# Patient Record
Sex: FEMALE | Race: WHITE | NOT HISPANIC OR LATINO | URBAN - METROPOLITAN AREA
[De-identification: names, ages, dates, MRNs, and addresses within clinical notes are randomized per-mention and may not be internally consistent; named-entity substitution may affect disease eponyms.]

---

## 2018-02-24 ENCOUNTER — EMERGENCY (EMERGENCY)
Facility: HOSPITAL | Age: 78
LOS: 1 days | Discharge: HOME | End: 2018-02-24
Attending: EMERGENCY MEDICINE | Admitting: EMERGENCY MEDICINE
Payer: MEDICARE

## 2018-02-24 VITALS
SYSTOLIC BLOOD PRESSURE: 167 MMHG | RESPIRATION RATE: 18 BRPM | TEMPERATURE: 98 F | OXYGEN SATURATION: 100 % | DIASTOLIC BLOOD PRESSURE: 77 MMHG | HEART RATE: 64 BPM

## 2018-02-24 PROCEDURE — 70450 CT HEAD/BRAIN W/O DYE: CPT | Mod: 26

## 2018-02-24 PROCEDURE — 99284 EMERGENCY DEPT VISIT MOD MDM: CPT | Mod: 25

## 2018-02-24 PROCEDURE — 70450 CT HEAD/BRAIN W/O DYE: CPT

## 2018-02-24 PROCEDURE — 99284 EMERGENCY DEPT VISIT MOD MDM: CPT | Mod: GC

## 2018-02-24 PROCEDURE — 70486 CT MAXILLOFACIAL W/O DYE: CPT | Mod: 26

## 2018-02-24 PROCEDURE — 70486 CT MAXILLOFACIAL W/O DYE: CPT

## 2018-02-24 NOTE — ED ADULT NURSE NOTE - OBJECTIVE STATEMENT
76 Yo male A&OX3 presents to the ED with the c/o fall while picking up a newspaper. Pt states that she fell onto face, no numbness or tingling. Pt states that she did no lose consciousness, on ASA 81mg daily. Lungs clear, equal b/l no cough and no sob.

## 2018-02-24 NOTE — ED PROVIDER NOTE - PHYSICAL EXAMINATION
Attending MD Del Real: A & O x 3, NAD, +left forehead hematoma, +ecchymosis b/l periorbital area, no focal bony facial ttp, nontender spine, EOMI b/l, PERRL b/l; lungs CTAB, heart with reg rhythm without murmur; abdomen soft NTND; extremities with no edema; affect appropriate. neuro exam non focal with no motor or sensory deficits. nontender extremities resident: Gen: NAD, AOx3  Head: bruising and swelling to left forehead with extension to bilateral periorbital area, tenderness over forehead, no periorbital tenderness, EOMI and painless  HEENT: PERRL, oral mucosa moist, normal conjunctiva  Lung: CTAB, no respiratory distress  CV: rrr, no murmurs, Normal perfusion  Abd: soft, NTND, no CVA tenderness  MSK: No edema, no visible deformities, soft compartments, all extremity joints nontender with full ROM; chest wall nontender; entire spine without midline tenderness and with full ROM, no stepoffs or masses   Neuro: No focal neurologic deficits, CN intact, motor and sensation intact, no cerebellar signs   Skin: No rash   Psych: normal affect     Attending MD Nasima: A & O x 3, NAD, +left forehead hematoma, +ecchymosis b/l periorbital area, no focal bony facial ttp, nontender spine, EOMI b/l, PERRL b/l; lungs CTAB, heart with reg rhythm without murmur; abdomen soft NTND; extremities with no edema; affect appropriate. neuro exam non focal with no motor or sensory deficits. nontender extremities resident: Gen: NAD, AOx3  Head: bruising and swelling to left forehead with extension to bilateral periorbital area, tenderness over forehead, no periorbital tenderness, EOMI and painless  HEENT: PERRL, oral mucosa moist, normal conjunctiva, no septal hematoma  Lung: CTAB, no respiratory distress  CV: rrr, no murmurs, Normal perfusion  Abd: soft, NTND, no CVA tenderness  MSK: No edema, no visible deformities, soft compartments, b/l no snuffbox tenderness, all extremity joints nontender with full ROM; chest wall nontender; entire spine without midline tenderness and with full ROM, no stepoffs or masses   Neuro: No focal neurologic deficits, CN intact, motor and sensation intact, no cerebellar signs   Skin: No rash   Psych: normal affect     Attending MD Nasima: A & O x 3, NAD, +left forehead hematoma, +ecchymosis b/l periorbital area, no focal bony facial ttp, nontender spine, EOMI b/l, PERRL b/l; lungs CTAB, heart with reg rhythm without murmur; abdomen soft NTND; extremities with no edema; affect appropriate. neuro exam non focal with no motor or sensory deficits. nontender extremities

## 2018-02-24 NOTE — ED PROVIDER NOTE - ATTENDING CONTRIBUTION TO CARE
Attending MD Del Real:  I personally have seen and examined this patient.  Resident note reviewed and agree on plan of care and except where noted.  See HPI, PE, and MDM for details.

## 2018-02-24 NOTE — ED PROVIDER NOTE - MEDICAL DECISION MAKING DETAILS
Attending MD Del Real: 77F presenting with facial bruising s/p mechanical fall 2 days prior with face strike, worsening ecchymosis of face. Exam with left forehead hematoma with b/l periorbtial ecchymosis, likely dependent changes of ecchymosis but will obtain CT head and max-face to r/o ICH or underlying facial fx's. Cervical spine cleared clinically of fracture without need for imaging according to [facility] Criteria

## 2018-02-24 NOTE — ED PROVIDER NOTE - SHIFT CHANGE DETAILS
Attending MD Her: Mechanical fall onto face 2 days ago, now with periorbital ecchymosis, appears dependent, pending CT head and CT Max/Face.  If negative, can go home with outpatient follow up with PMD.

## 2018-02-24 NOTE — ED PROVIDER NOTE - OBJECTIVE STATEMENT
Patient is 77 y F with PMH CAD with stent 2011, RA on methotrexate, hld presenting with 2 days ago, bent down to get newspaper lost balance and fell forward and hit face on marble floor, no LOC, before fall had no lightheadedness or pain, comes to ED because she is concerned about facial bruising. +ambulatory. +ASA81 no blood thinners. no pain with extraoccular movements, no double vision, no blurry vision.     PMD/cards: cameron  ROS: Denies fever, palpitations, chills, recent sickness, HA, vision changes, cough, SOB, chest pain, abdominal pain, n/v/d/c, dysuria, hematuria, rash, new joint aches, sick contacts, and recent travel.

## 2018-02-24 NOTE — ED PROVIDER NOTE - PROGRESS NOTE DETAILS
Attending MD Her: Patient re-evaluated and feeling improved.  No acute issues at  this time.  Radiology tests reviewed with patient.  Patient stable for discharge. Follow up instructions given to follow up with PMD in 24-48hrs, importance of follow up emphasized, return to ED parameters reviewed and patient verbalized understanding.  All questions answered, all concerns addressed. Patient is ambulatory, not in pain, CTs are negative for acute pathology. ok to dc home. -SM

## 2018-04-10 ENCOUNTER — APPOINTMENT (OUTPATIENT)
Dept: OPHTHALMOLOGY | Facility: CLINIC | Age: 78
End: 2018-04-10
Payer: MEDICARE

## 2018-04-10 PROCEDURE — 92004 COMPRE OPH EXAM NEW PT 1/>: CPT

## 2018-05-21 ENCOUNTER — APPOINTMENT (OUTPATIENT)
Dept: OPHTHALMOLOGY | Facility: CLINIC | Age: 78
End: 2018-05-21
Payer: MEDICARE

## 2018-05-21 PROCEDURE — ZZZZZ: CPT

## 2018-05-21 PROCEDURE — 92012 INTRM OPH EXAM EST PATIENT: CPT

## 2018-06-15 ENCOUNTER — APPOINTMENT (OUTPATIENT)
Dept: OPHTHALMOLOGY | Facility: CLINIC | Age: 78
End: 2018-06-15
Payer: MEDICARE

## 2018-06-15 PROCEDURE — 92015 DETERMINE REFRACTIVE STATE: CPT

## 2020-07-11 ENCOUNTER — OUTPATIENT (OUTPATIENT)
Dept: OUTPATIENT SERVICES | Facility: HOSPITAL | Age: 80
LOS: 1 days | End: 2020-07-11
Payer: MEDICARE

## 2020-07-11 ENCOUNTER — APPOINTMENT (OUTPATIENT)
Dept: CT IMAGING | Facility: CLINIC | Age: 80
End: 2020-07-11
Payer: MEDICARE

## 2020-07-11 DIAGNOSIS — Z71.9 COUNSELING, UNSPECIFIED: ICD-10-CM

## 2020-07-11 PROCEDURE — 74177 CT ABD & PELVIS W/CONTRAST: CPT

## 2020-07-11 PROCEDURE — 74177 CT ABD & PELVIS W/CONTRAST: CPT | Mod: 26

## 2020-07-13 PROBLEM — Z95.5 PRESENCE OF CORONARY ANGIOPLASTY IMPLANT AND GRAFT: Chronic | Status: ACTIVE | Noted: 2018-02-24

## 2020-07-13 PROBLEM — M19.90 UNSPECIFIED OSTEOARTHRITIS, UNSPECIFIED SITE: Chronic | Status: ACTIVE | Noted: 2018-02-24

## 2020-07-22 ENCOUNTER — OUTPATIENT (OUTPATIENT)
Dept: OUTPATIENT SERVICES | Facility: HOSPITAL | Age: 80
LOS: 1 days | End: 2020-07-22
Payer: MEDICARE

## 2020-07-22 ENCOUNTER — APPOINTMENT (OUTPATIENT)
Dept: MRI IMAGING | Facility: CLINIC | Age: 80
End: 2020-07-22
Payer: MEDICARE

## 2020-07-22 DIAGNOSIS — Z00.8 ENCOUNTER FOR OTHER GENERAL EXAMINATION: ICD-10-CM

## 2020-07-22 PROCEDURE — 72197 MRI PELVIS W/O & W/DYE: CPT | Mod: 26

## 2020-07-22 PROCEDURE — 72197 MRI PELVIS W/O & W/DYE: CPT

## 2020-07-22 PROCEDURE — A9585: CPT

## 2020-07-29 ENCOUNTER — RESULT REVIEW (OUTPATIENT)
Age: 80
End: 2020-07-29

## 2021-07-28 ENCOUNTER — NON-APPOINTMENT (OUTPATIENT)
Age: 81
End: 2021-07-28

## 2021-07-28 ENCOUNTER — APPOINTMENT (OUTPATIENT)
Dept: OPHTHALMOLOGY | Facility: CLINIC | Age: 81
End: 2021-07-28
Payer: MEDICARE

## 2021-07-28 PROCEDURE — 99072 ADDL SUPL MATRL&STAF TM PHE: CPT

## 2021-07-28 PROCEDURE — 92012 INTRM OPH EXAM EST PATIENT: CPT

## 2022-06-03 ENCOUNTER — INPATIENT (INPATIENT)
Facility: HOSPITAL | Age: 82
LOS: 9 days | Discharge: NURSING, REHAB OR LONG-TERM CARE | DRG: 300 | End: 2022-06-13
Attending: INTERNAL MEDICINE | Admitting: INTERNAL MEDICINE
Payer: MEDICARE

## 2022-06-03 VITALS
SYSTOLIC BLOOD PRESSURE: 132 MMHG | HEIGHT: 60 IN | OXYGEN SATURATION: 92 % | HEART RATE: 85 BPM | WEIGHT: 106.92 LBS | RESPIRATION RATE: 20 BRPM | DIASTOLIC BLOOD PRESSURE: 65 MMHG | TEMPERATURE: 98 F

## 2022-06-03 DIAGNOSIS — L03.90 CELLULITIS, UNSPECIFIED: ICD-10-CM

## 2022-06-03 DIAGNOSIS — I25.10 ATHEROSCLEROTIC HEART DISEASE OF NATIVE CORONARY ARTERY WITHOUT ANGINA PECTORIS: ICD-10-CM

## 2022-06-03 DIAGNOSIS — Z29.9 ENCOUNTER FOR PROPHYLACTIC MEASURES, UNSPECIFIED: ICD-10-CM

## 2022-06-03 DIAGNOSIS — E78.5 HYPERLIPIDEMIA, UNSPECIFIED: ICD-10-CM

## 2022-06-03 LAB
ALBUMIN SERPL ELPH-MCNC: 3.2 G/DL — LOW (ref 3.3–5)
ALP SERPL-CCNC: 57 U/L — SIGNIFICANT CHANGE UP (ref 40–120)
ALT FLD-CCNC: 33 U/L — SIGNIFICANT CHANGE UP (ref 10–45)
ANION GAP SERPL CALC-SCNC: 10 MMOL/L — SIGNIFICANT CHANGE UP (ref 5–17)
ANION GAP SERPL CALC-SCNC: 12 MMOL/L — SIGNIFICANT CHANGE UP (ref 5–17)
ANISOCYTOSIS BLD QL: SLIGHT — SIGNIFICANT CHANGE UP
APTT BLD: 33.6 SEC — SIGNIFICANT CHANGE UP (ref 27.5–35.5)
AST SERPL-CCNC: 73 U/L — HIGH (ref 10–40)
BASE EXCESS BLDV CALC-SCNC: 0.1 MMOL/L — SIGNIFICANT CHANGE UP (ref -2–2)
BASOPHILS # BLD AUTO: 0 K/UL — SIGNIFICANT CHANGE UP (ref 0–0.2)
BASOPHILS NFR BLD AUTO: 0 % — SIGNIFICANT CHANGE UP (ref 0–2)
BILIRUB SERPL-MCNC: 0.5 MG/DL — SIGNIFICANT CHANGE UP (ref 0.2–1.2)
BUN SERPL-MCNC: 19 MG/DL — SIGNIFICANT CHANGE UP (ref 7–23)
BUN SERPL-MCNC: 20 MG/DL — SIGNIFICANT CHANGE UP (ref 7–23)
BURR CELLS BLD QL SMEAR: SLIGHT — SIGNIFICANT CHANGE UP
CA-I SERPL-SCNC: 1.25 MMOL/L — SIGNIFICANT CHANGE UP (ref 1.15–1.33)
CALCIUM SERPL-MCNC: 8.6 MG/DL — SIGNIFICANT CHANGE UP (ref 8.4–10.5)
CALCIUM SERPL-MCNC: 9.6 MG/DL — SIGNIFICANT CHANGE UP (ref 8.4–10.5)
CHLORIDE BLDV-SCNC: 105 MMOL/L — SIGNIFICANT CHANGE UP (ref 96–108)
CHLORIDE SERPL-SCNC: 105 MMOL/L — SIGNIFICANT CHANGE UP (ref 96–108)
CHLORIDE SERPL-SCNC: 105 MMOL/L — SIGNIFICANT CHANGE UP (ref 96–108)
CO2 BLDV-SCNC: 26 MMOL/L — SIGNIFICANT CHANGE UP (ref 22–26)
CO2 SERPL-SCNC: 20 MMOL/L — LOW (ref 22–31)
CO2 SERPL-SCNC: 23 MMOL/L — SIGNIFICANT CHANGE UP (ref 22–31)
CREAT SERPL-MCNC: 0.66 MG/DL — SIGNIFICANT CHANGE UP (ref 0.5–1.3)
CREAT SERPL-MCNC: 0.72 MG/DL — SIGNIFICANT CHANGE UP (ref 0.5–1.3)
EGFR: 82 ML/MIN/1.73M2 — SIGNIFICANT CHANGE UP
EGFR: 86 ML/MIN/1.73M2 — SIGNIFICANT CHANGE UP
ELLIPTOCYTES BLD QL SMEAR: SLIGHT — SIGNIFICANT CHANGE UP
EOSINOPHIL # BLD AUTO: 2.97 K/UL — HIGH (ref 0–0.5)
EOSINOPHIL NFR BLD AUTO: 27.3 % — HIGH (ref 0–6)
GAS PNL BLDV: 133 MMOL/L — LOW (ref 136–145)
GAS PNL BLDV: SIGNIFICANT CHANGE UP
GAS PNL BLDV: SIGNIFICANT CHANGE UP
GLUCOSE BLDV-MCNC: 83 MG/DL — SIGNIFICANT CHANGE UP (ref 70–99)
GLUCOSE SERPL-MCNC: 109 MG/DL — HIGH (ref 70–99)
GLUCOSE SERPL-MCNC: 81 MG/DL — SIGNIFICANT CHANGE UP (ref 70–99)
HCO3 BLDV-SCNC: 25 MMOL/L — SIGNIFICANT CHANGE UP (ref 22–29)
HCT VFR BLD CALC: 36.6 % — SIGNIFICANT CHANGE UP (ref 34.5–45)
HCT VFR BLDA CALC: 37 % — SIGNIFICANT CHANGE UP (ref 34.5–46.5)
HGB BLD CALC-MCNC: 12.2 G/DL — SIGNIFICANT CHANGE UP (ref 11.7–16.1)
HGB BLD-MCNC: 11.8 G/DL — SIGNIFICANT CHANGE UP (ref 11.5–15.5)
INR BLD: 1.2 RATIO — HIGH (ref 0.88–1.16)
LACTATE BLDV-MCNC: 2.1 MMOL/L — HIGH (ref 0.7–2)
LACTATE SERPL-SCNC: 1.5 MMOL/L — SIGNIFICANT CHANGE UP (ref 0.7–2)
LYMPHOCYTES # BLD AUTO: 1.35 K/UL — SIGNIFICANT CHANGE UP (ref 1–3.3)
LYMPHOCYTES # BLD AUTO: 12.4 % — LOW (ref 13–44)
MACROCYTES BLD QL: SLIGHT — SIGNIFICANT CHANGE UP
MANUAL SMEAR VERIFICATION: SIGNIFICANT CHANGE UP
MCHC RBC-ENTMCNC: 28.2 PG — SIGNIFICANT CHANGE UP (ref 27–34)
MCHC RBC-ENTMCNC: 32.2 GM/DL — SIGNIFICANT CHANGE UP (ref 32–36)
MCV RBC AUTO: 87.4 FL — SIGNIFICANT CHANGE UP (ref 80–100)
MONOCYTES # BLD AUTO: 0.99 K/UL — HIGH (ref 0–0.9)
MONOCYTES NFR BLD AUTO: 9.1 % — SIGNIFICANT CHANGE UP (ref 2–14)
NEUTROPHILS # BLD AUTO: 5.48 K/UL — SIGNIFICANT CHANGE UP (ref 1.8–7.4)
NEUTROPHILS NFR BLD AUTO: 50.4 % — SIGNIFICANT CHANGE UP (ref 43–77)
NT-PROBNP SERPL-SCNC: 950 PG/ML — HIGH (ref 0–300)
PCO2 BLDV: 39 MMHG — SIGNIFICANT CHANGE UP (ref 39–42)
PH BLDV: 7.41 — SIGNIFICANT CHANGE UP (ref 7.32–7.43)
PLAT MORPH BLD: NORMAL — SIGNIFICANT CHANGE UP
PLATELET # BLD AUTO: 325 K/UL — SIGNIFICANT CHANGE UP (ref 150–400)
PO2 BLDV: 42 MMHG — SIGNIFICANT CHANGE UP (ref 25–45)
POIKILOCYTOSIS BLD QL AUTO: SLIGHT — SIGNIFICANT CHANGE UP
POLYCHROMASIA BLD QL SMEAR: SLIGHT — SIGNIFICANT CHANGE UP
POTASSIUM BLDV-SCNC: 6 MMOL/L — HIGH (ref 3.5–5.1)
POTASSIUM SERPL-MCNC: 3.8 MMOL/L — SIGNIFICANT CHANGE UP (ref 3.5–5.3)
POTASSIUM SERPL-MCNC: 6.1 MMOL/L — HIGH (ref 3.5–5.3)
POTASSIUM SERPL-SCNC: 3.8 MMOL/L — SIGNIFICANT CHANGE UP (ref 3.5–5.3)
POTASSIUM SERPL-SCNC: 6.1 MMOL/L — HIGH (ref 3.5–5.3)
PROT SERPL-MCNC: 7.8 G/DL — SIGNIFICANT CHANGE UP (ref 6–8.3)
PROTHROM AB SERPL-ACNC: 13.9 SEC — HIGH (ref 10.5–13.4)
RAPID RVP RESULT: SIGNIFICANT CHANGE UP
RBC # BLD: 4.19 M/UL — SIGNIFICANT CHANGE UP (ref 3.8–5.2)
RBC # FLD: 15.9 % — HIGH (ref 10.3–14.5)
RBC BLD AUTO: ABNORMAL
SAO2 % BLDV: 74.8 % — SIGNIFICANT CHANGE UP (ref 67–88)
SARS-COV-2 RNA SPEC QL NAA+PROBE: SIGNIFICANT CHANGE UP
SODIUM SERPL-SCNC: 137 MMOL/L — SIGNIFICANT CHANGE UP (ref 135–145)
SODIUM SERPL-SCNC: 138 MMOL/L — SIGNIFICANT CHANGE UP (ref 135–145)
TARGETS BLD QL SMEAR: SLIGHT — SIGNIFICANT CHANGE UP
VARIANT LYMPHS # BLD: 0.8 % — SIGNIFICANT CHANGE UP (ref 0–6)
WBC # BLD: 10.87 K/UL — HIGH (ref 3.8–10.5)
WBC # FLD AUTO: 10.87 K/UL — HIGH (ref 3.8–10.5)

## 2022-06-03 PROCEDURE — 99223 1ST HOSP IP/OBS HIGH 75: CPT

## 2022-06-03 PROCEDURE — 71045 X-RAY EXAM CHEST 1 VIEW: CPT | Mod: 26

## 2022-06-03 PROCEDURE — 73590 X-RAY EXAM OF LOWER LEG: CPT | Mod: 26,50

## 2022-06-03 PROCEDURE — 99285 EMERGENCY DEPT VISIT HI MDM: CPT

## 2022-06-03 RX ORDER — FOLIC ACID 0.8 MG
0 TABLET ORAL
Qty: 0 | Refills: 0 | DISCHARGE

## 2022-06-03 RX ORDER — LANOLIN ALCOHOL/MO/W.PET/CERES
3 CREAM (GRAM) TOPICAL AT BEDTIME
Refills: 0 | Status: DISCONTINUED | OUTPATIENT
Start: 2022-06-03 | End: 2022-06-13

## 2022-06-03 RX ORDER — CEFAZOLIN SODIUM 1 G
2000 VIAL (EA) INJECTION ONCE
Refills: 0 | Status: COMPLETED | OUTPATIENT
Start: 2022-06-03 | End: 2022-06-03

## 2022-06-03 RX ORDER — ASPIRIN/CALCIUM CARB/MAGNESIUM 324 MG
81 TABLET ORAL DAILY
Refills: 0 | Status: DISCONTINUED | OUTPATIENT
Start: 2022-06-03 | End: 2022-06-13

## 2022-06-03 RX ORDER — VANCOMYCIN HCL 1 G
1000 VIAL (EA) INTRAVENOUS ONCE
Refills: 0 | Status: COMPLETED | OUTPATIENT
Start: 2022-06-03 | End: 2022-06-03

## 2022-06-03 RX ORDER — FOLIC ACID 0.8 MG
1 TABLET ORAL DAILY
Refills: 0 | Status: DISCONTINUED | OUTPATIENT
Start: 2022-06-03 | End: 2022-06-13

## 2022-06-03 RX ORDER — METHOTREXATE 2.5 MG/1
0 TABLET ORAL
Qty: 0 | Refills: 0 | DISCHARGE

## 2022-06-03 RX ORDER — FOLIC ACID 0.8 MG
1 TABLET ORAL
Qty: 0 | Refills: 0 | DISCHARGE

## 2022-06-03 RX ORDER — ACETAMINOPHEN 500 MG
650 TABLET ORAL EVERY 6 HOURS
Refills: 0 | Status: DISCONTINUED | OUTPATIENT
Start: 2022-06-03 | End: 2022-06-13

## 2022-06-03 RX ORDER — METOPROLOL TARTRATE 50 MG
0 TABLET ORAL
Qty: 0 | Refills: 0 | DISCHARGE

## 2022-06-03 RX ORDER — PIPERACILLIN AND TAZOBACTAM 4; .5 G/20ML; G/20ML
3.38 INJECTION, POWDER, LYOPHILIZED, FOR SOLUTION INTRAVENOUS ONCE
Refills: 0 | Status: COMPLETED | OUTPATIENT
Start: 2022-06-03 | End: 2022-06-03

## 2022-06-03 RX ORDER — PETROLATUM,WHITE
1 JELLY (GRAM) TOPICAL THREE TIMES A DAY
Refills: 0 | Status: DISCONTINUED | OUTPATIENT
Start: 2022-06-03 | End: 2022-06-13

## 2022-06-03 RX ORDER — LEVOTHYROXINE SODIUM 125 MCG
0 TABLET ORAL
Qty: 0 | Refills: 0 | DISCHARGE

## 2022-06-03 RX ORDER — METOPROLOL TARTRATE 50 MG
0.5 TABLET ORAL
Qty: 0 | Refills: 0 | DISCHARGE

## 2022-06-03 RX ORDER — UREA 15 G
15 POWDER IN PACKET (EA) ORAL DAILY
Refills: 0 | Status: DISCONTINUED | OUTPATIENT
Start: 2022-06-03 | End: 2022-06-13

## 2022-06-03 RX ORDER — ATORVASTATIN CALCIUM 80 MG/1
20 TABLET, FILM COATED ORAL AT BEDTIME
Refills: 0 | Status: DISCONTINUED | OUTPATIENT
Start: 2022-06-03 | End: 2022-06-13

## 2022-06-03 RX ORDER — ASPIRIN/CALCIUM CARB/MAGNESIUM 324 MG
1 TABLET ORAL
Qty: 0 | Refills: 0 | DISCHARGE

## 2022-06-03 RX ORDER — HYDROXYZINE HCL 10 MG
25 TABLET ORAL THREE TIMES A DAY
Refills: 0 | Status: DISCONTINUED | OUTPATIENT
Start: 2022-06-03 | End: 2022-06-13

## 2022-06-03 RX ORDER — HYDROXYZINE HCL 10 MG
25 TABLET ORAL ONCE
Refills: 0 | Status: COMPLETED | OUTPATIENT
Start: 2022-06-03 | End: 2022-06-03

## 2022-06-03 RX ORDER — ONDANSETRON 8 MG/1
4 TABLET, FILM COATED ORAL EVERY 8 HOURS
Refills: 0 | Status: DISCONTINUED | OUTPATIENT
Start: 2022-06-03 | End: 2022-06-13

## 2022-06-03 RX ORDER — ACETAMINOPHEN 500 MG
650 TABLET ORAL ONCE
Refills: 0 | Status: COMPLETED | OUTPATIENT
Start: 2022-06-03 | End: 2022-06-03

## 2022-06-03 RX ORDER — SODIUM CHLORIDE 9 MG/ML
1000 INJECTION, SOLUTION INTRAVENOUS ONCE
Refills: 0 | Status: COMPLETED | OUTPATIENT
Start: 2022-06-03 | End: 2022-06-03

## 2022-06-03 RX ORDER — CEFAZOLIN SODIUM 1 G
2000 VIAL (EA) INJECTION EVERY 8 HOURS
Refills: 0 | Status: DISCONTINUED | OUTPATIENT
Start: 2022-06-03 | End: 2022-06-05

## 2022-06-03 RX ORDER — METOPROLOL TARTRATE 50 MG
12.5 TABLET ORAL
Refills: 0 | Status: DISCONTINUED | OUTPATIENT
Start: 2022-06-03 | End: 2022-06-13

## 2022-06-03 RX ORDER — ROSUVASTATIN CALCIUM 5 MG/1
1 TABLET ORAL
Qty: 0 | Refills: 0 | DISCHARGE

## 2022-06-03 RX ORDER — ENOXAPARIN SODIUM 100 MG/ML
40 INJECTION SUBCUTANEOUS EVERY 24 HOURS
Refills: 0 | Status: DISCONTINUED | OUTPATIENT
Start: 2022-06-03 | End: 2022-06-13

## 2022-06-03 RX ORDER — LEVOTHYROXINE SODIUM 125 MCG
50 TABLET ORAL DAILY
Refills: 0 | Status: DISCONTINUED | OUTPATIENT
Start: 2022-06-03 | End: 2022-06-13

## 2022-06-03 RX ORDER — CEFAZOLIN SODIUM 1 G
VIAL (EA) INJECTION
Refills: 0 | Status: DISCONTINUED | OUTPATIENT
Start: 2022-06-03 | End: 2022-06-05

## 2022-06-03 RX ORDER — LEVOTHYROXINE SODIUM 125 MCG
1 TABLET ORAL
Qty: 0 | Refills: 0 | DISCHARGE

## 2022-06-03 RX ORDER — SODIUM ZIRCONIUM CYCLOSILICATE 10 G/10G
10 POWDER, FOR SUSPENSION ORAL ONCE
Refills: 0 | Status: DISCONTINUED | OUTPATIENT
Start: 2022-06-03 | End: 2022-06-03

## 2022-06-03 RX ADMIN — Medication 250 MILLIGRAM(S): at 15:00

## 2022-06-03 RX ADMIN — Medication 650 MILLIGRAM(S): at 13:43

## 2022-06-03 RX ADMIN — Medication 12.5 MILLIGRAM(S): at 19:39

## 2022-06-03 RX ADMIN — SODIUM CHLORIDE 1000 MILLILITER(S): 9 INJECTION, SOLUTION INTRAVENOUS at 15:01

## 2022-06-03 RX ADMIN — Medication 100 MILLIGRAM(S): at 19:40

## 2022-06-03 RX ADMIN — Medication 25 MILLIGRAM(S): at 14:58

## 2022-06-03 RX ADMIN — ATORVASTATIN CALCIUM 20 MILLIGRAM(S): 80 TABLET, FILM COATED ORAL at 22:39

## 2022-06-03 RX ADMIN — PIPERACILLIN AND TAZOBACTAM 200 GRAM(S): 4; .5 INJECTION, POWDER, LYOPHILIZED, FOR SOLUTION INTRAVENOUS at 13:43

## 2022-06-03 RX ADMIN — Medication 25 MILLIGRAM(S): at 22:38

## 2022-06-03 RX ADMIN — ENOXAPARIN SODIUM 40 MILLIGRAM(S): 100 INJECTION SUBCUTANEOUS at 19:39

## 2022-06-03 RX ADMIN — Medication 1 APPLICATION(S): at 22:38

## 2022-06-03 NOTE — H&P ADULT - NSHPLABSRESULTS_GEN_ALL_CORE
LABS:                        11.8   10.87 )-----------( 325      ( 03 Jun 2022 14:15 )             36.6     06-03    138  |  105  |  19  ----------------------------<  109<H>  3.8   |  23  |  0.72    Ca    8.6      03 Jun 2022 16:43    TPro  7.8  /  Alb  3.2<L>  /  TBili  0.5  /  DBili  x   /  AST  73<H>  /  ALT  33  /  AlkPhos  57  06-03    PT/INR - ( 03 Jun 2022 14:15 )   PT: 13.9 sec;   INR: 1.20 ratio         PTT - ( 03 Jun 2022 14:15 )  PTT:33.6 sec            RADIOLOGY & ADDITIONAL TESTS:  Results Reviewed:   Imaging Personally Reviewed:  Electrocardiogram Personally Reviewed:    COORDINATION OF CARE:  Care Discussed with Consultants/Other Providers [Y/N]:  Prior or Outpatient Records Reviewed [Y/N]: LABS:                        11.8   10.87 )-----------( 325      ( 03 Jun 2022 14:15 )             36.6     06-03    138  |  105  |  19  ----------------------------<  109<H>  3.8   |  23  |  0.72    Ca    8.6      03 Jun 2022 16:43    TPro  7.8  /  Alb  3.2<L>  /  TBili  0.5  /  DBili  x   /  AST  73<H>  /  ALT  33  /  AlkPhos  57  06-03    PT/INR - ( 03 Jun 2022 14:15 )   PT: 13.9 sec;   INR: 1.20 ratio         PTT - ( 03 Jun 2022 14:15 )  PTT:33.6 sec      XR TIB FIB AP LAT 2 VIEWS BI    IMPRESSION:  Diffuse soft tissue swelling. No tracking soft tissue gas collections, radiopaque foreign bodies, or gross radiographic evidence for osteomyelitis.  No fractures, dislocations, or osseous or joint deformities.  Bilateral knee tricompartment osteoarthritis with lateral tibiofemoral compartment predominance. Bilaterally congruent ankle mortises with smooth and intact talar domes.  Generalized osteopenia otherwise no discrete suspicious lytic or blastic lesions.  Upper posterior right calf surgical clips.      RADIOLOGY & ADDITIONAL TESTS:  Results Reviewed:   Imaging Personally Reviewed: CXR - no consolidation or effusion   Electrocardiogram Personally Reviewed:    COORDINATION OF CARE:  Care Discussed with Consultants/Other Providers [Y/N]:  Prior or Outpatient Records Reviewed [Y/N]:

## 2022-06-03 NOTE — ED PROVIDER NOTE - PHYSICAL EXAMINATION
CONSTITUTIONAL: non-toxic, rigoring  SKIN: scattered bilateral lower extremity erythema and increased warmth, tender, no petechiae, no crepitus  EYES: pink conjunctiva, anicteric  ENT: tongue and uvular midline, no exudates, moist mucous membranes  NECK: Supple; no meningismus, no JVD  CARD: RRR, no murmurs, equal radial pulses bilaterally 2+  RESP: CTAB, no respiratory distress  ABD: Soft, non-tender, non-distended, no peritoneal signs  EXT: Normal ROM x4. No edema.   NEURO: Alert, oriented. Neuro exam nonfocal.  PSYCH: Cooperative, appropriate.

## 2022-06-03 NOTE — ED ADULT NURSE NOTE - OBJECTIVE STATEMENT
Pt 81 y/o female, AxOX3, presents to ED from dermatology office complaining of increased swelling and b/l LE redness and pain x 4 days. Reports that b/l LE started to have clear d/c- prompting ED visit. Pt is uncomfortable appearing, speaking full sentences without difficulty. Breathing spontaneous and unlabored. Upon assessment, diffuse red rash to generalized body, abdomen soft and nontender, +strong peripheral pulses, moving all extremities without difficulty, lungs clear, b/l LE swelling and redness with weeping wounds. Safety and comfort measures initiated- bed placed in lowest position and side rails raised. Pt oriented to call bell system.

## 2022-06-03 NOTE — ED CLERICAL - NS ED CLERK NOTE PRE-ARRIVAL INFORMATION; ADDITIONAL PRE-ARRIVAL INFORMATION
CC/Reason For referral: r/o sepsis or dvt, weeping edema left leg. febrile and current tenderness.  Preferred Consultant(if applicable):  Who admits for you (if needed):  Do you have documents you would like to fax over?  Would you still like to speak to an ED attending? yes

## 2022-06-03 NOTE — H&P ADULT - PROBLEM SELECTOR PLAN 1
- With LE erythema, warmth, tenderness, swelling. Mild leukocytosis but does not meet sepsis  - Xrays w/ diffuse soft tissue swelling. No tracking soft tissue gas collections, radiopaque foreign bodies, or gross radiographic evidence for osteomyelitis.  - s/p vanc and zosyn in the ED, will start on cefazolin   - f/u bcx  - consider LE dopplers if not improving

## 2022-06-03 NOTE — H&P ADULT - HISTORY OF PRESENT ILLNESS
This is a 82 year old female with PMH CAD on aspirin, HTN, HLD, dermatitis who presented to the ED for b/l leg redness, swelling, pain that started all of a sudden around 3 days ago. Patient has also been experiencing chills, subjective fever, and worsening of her baseline dermatitis. She went to the dermatologist today and was sent to the ED. No CP, SOB, abd pain, N/V, dysuria.  In the ER, afebrile, HD stable. Given vanc, zosyn, 1L LR.

## 2022-06-03 NOTE — ED PROVIDER NOTE - EMPLOYMENT
CVA (cerebral vascular accident)    Down syndrome    Hyperlipidemia    PFO (patent foramen ovale)    Psoriasis N/A

## 2022-06-03 NOTE — H&P ADULT - ASSESSMENT
This is a 82 year old female with PMH CAD on aspirin, HTN, HLD, dermatitis who presented to the ED for b/l leg redness, swelling, pain that started all of a sudden around 3 days ago.

## 2022-06-03 NOTE — ED PROVIDER NOTE - CLINICAL SUMMARY MEDICAL DECISION MAKING FREE TEXT BOX
Stoney-PGY3: 82 year old female with PMH CAD on aspirin, HTN, HLD, dermatitis presents with bilateral leg redness and pain x 3-4 days. Pt reports bilateral leg redness, pain, weeping clear discharge, subjective fevers, and shaking chills. Likely infectious/cellulitis, pt actively rigoring concern for sepsis/bacteremia. Will obtain labs, imaging, supportive treatment, likely admission for further evaluation/management.

## 2022-06-03 NOTE — ED PROVIDER NOTE - OBJECTIVE STATEMENT
82 year old female with PMH CAD on aspirin, HTN, HLD, dermatitis presents with bilateral leg redness and pain x 3-4 days. Pt reports bilateral leg redness, pain, weeping clear discharge, subjective fevers, and shaking chills. Pt reports mild shortness of breath, but denies any chest pain, abdominal pain, vomiting, diarrhea, bloody stools, black tarry stools, dysuria, headache, vision change, numbness, or weakness. Pt states she went to dermatologist today for leg rash and was sent to ED for further evaluation/management. Denies any additional complaints.

## 2022-06-03 NOTE — ED PROVIDER NOTE - ATTENDING CONTRIBUTION TO CARE
I, Noemi Reese, performed a history and physical exam of the patient and discussed their management with the resident and /or advanced care provider. I reviewed the resident and /or ACP's note and agree with the documented findings and plan of care except where otherwise noted in my note. I was present and available for all procedures.     81 y/o F with PMH CAD on aspirin, HTN, HLD, dermatitis (supposed to be on MTX but stopped on her own) presents with bilateral leg redness and pain x 3-4 days with weeping discharge and chills. No penetrating trauma, but does say she rolled off her couch last week while sleeping. No chest pain, no cough, no abdominal pain. On exam, patient is rigoring, diffuse dry, scaly skin typical of her dermatitis. Lungs CTA b/l, abdomen soft, nontender. B/l LE erythema and tenderness to lower extremities with clear weeping discharge, small punctate wounds but no large ulcers noted. Concern for sepsis/bacteremia likely 2/2 cellulitis, no suspicion nec fasc at this time. no concern for dvt at [facility] given b/l nature, no hormonal therapy, no hx dvt, has been ambulatory, no recent travel or surgeries. No other sources of infection identified on history and physical. Labs, xrays to eval for subQ gas, UA/UCx, Bcx, empiric abx, to be admitted,.

## 2022-06-03 NOTE — ED PROVIDER NOTE - PROGRESS NOTE DETAILS
Eddieks-PGY3: XR without evidence of SubQ gas. Rigoring improved. Discussed with Dr. Schwartz, accepted for admission to Dr. Alaniz's service.

## 2022-06-03 NOTE — H&P ADULT - NSHPREVIEWOFSYSTEMS_GEN_ALL_CORE
Review of Systems:   CONSTITUTIONAL: No weight loss  EYES: No eye pain, visual disturbances, or discharge  ENMT:  No difficulty hearing, tinnitus, vertigo; No sinus or throat pain  RESPIRATORY: No SOB. No cough, wheezing, chills or hemoptysis  CARDIOVASCULAR: No chest pain, palpitations, dizziness  GASTROINTESTINAL: No abdominal or epigastric pain. No nausea, vomiting, or hematemesis; No diarrhea or constipation. No melena or hematochezia.  GENITOURINARY: No dysuria, frequency, hematuria, or incontinence  NEUROLOGICAL: No headaches, memory loss, loss of strength, numbness, or tremors  SKIN: No burning, + rashes  LYMPH NODES: No enlarged glands  ENDOCRINE: No heat or cold intolerance; No hair loss  MUSCULOSKELETAL: No joint pain or swelling; No muscle, back pain  PSYCHIATRIC: No depression, anxiety, mood swings, or difficulty sleeping  HEME/LYMPH: No easy bruising, or bleeding gums

## 2022-06-03 NOTE — PATIENT PROFILE ADULT - FALL HARM RISK - HARM RISK INTERVENTIONS

## 2022-06-03 NOTE — H&P ADULT - NSHPPHYSICALEXAM_GEN_ALL_CORE
PHYSICAL EXAM:  Vital Signs Last 24 Hrs  T(C): 36.8 (03 Jun 2022 13:55), Max: 36.8 (03 Jun 2022 13:55)  T(F): 98.2 (03 Jun 2022 13:55), Max: 98.2 (03 Jun 2022 13:55)  HR: 80 (03 Jun 2022 13:55) (80 - 85)  BP: 125/52 (03 Jun 2022 13:55) (125/52 - 132/65)  BP(mean): --  RR: 18 (03 Jun 2022 13:55) (18 - 20)  SpO2: 95% (03 Jun 2022 13:55) (92% - 95%)    CONSTITUTIONAL: NAD, well-developed, well-groomed  EYES: PERRLA; conjunctiva and sclera clear  ENMT: Moist oral mucosa, no pharyngeal injection or exudates; normal dentition  NECK: Supple, no palpable masses; no thyromegaly  RESPIRATORY: Normal respiratory effort; lungs are clear to auscultation bilaterally  CARDIOVASCULAR: Regular rate and rhythm, normal S1 and S2, no murmur/rub/gallop; No lower extremity edema; Peripheral pulses are 2+ bilaterally  ABDOMEN: Nontender to palpation, normoactive bowel sounds, no rebound/guarding; No hepatosplenomegaly  MUSCULOSKELETAL:  Normal gait; no clubbing or cyanosis of digits; no joint swelling or tenderness to palpation  PSYCH: A+O to person, place, and time; affect appropriate  NEUROLOGY: CN 2-12 are intact and symmetric; no gross sensory deficits   SKIN: No rashes; no palpable lesions PHYSICAL EXAM:  Vital Signs Last 24 Hrs  T(C): 36.8 (03 Jun 2022 13:55), Max: 36.8 (03 Jun 2022 13:55)  T(F): 98.2 (03 Jun 2022 13:55), Max: 98.2 (03 Jun 2022 13:55)  HR: 80 (03 Jun 2022 13:55) (80 - 85)  BP: 125/52 (03 Jun 2022 13:55) (125/52 - 132/65)  BP(mean): --  RR: 18 (03 Jun 2022 13:55) (18 - 20)  SpO2: 95% (03 Jun 2022 13:55) (92% - 95%)    CONSTITUTIONAL: NAD, well-developed, well-groomed  EYES: PERRL; conjunctiva and sclera clear  ENMT: Moist oral mucosa, no pharyngeal injection or exudates  NECK: Supple, no palpable masses; no thyromegaly  RESPIRATORY: Normal respiratory effort; lungs are clear to auscultation bilaterally  CARDIOVASCULAR: Regular rate and rhythm, normal S1 and S2; + lower extremity edema  ABDOMEN: Nontender to palpation, normoactive bowel sounds, no rebound/guarding  MUSCULOSKELETAL: no clubbing or cyanosis of digits; no joint swelling or tenderness to palpation, b/l LE swelling, weeping, erythema, + tenderness, warm  PSYCH: A+O to person, place, and time; affect appropriate  NEUROLOGY: CN 2-12 are intact and symmetric; no gross sensory deficits   SKIN: diffuse dry skin on UE, face, + excoriations

## 2022-06-03 NOTE — ED PROVIDER NOTE - NS ED ROS FT
Review of Systems    Constitutional: (+) fever, (+) chills, (+) fatigue  HEENT: (-) sore throat, (-) hearing loss, (-) nasal congestion  Cardiovascular: (-) chest pain, (-) syncope  Respiratory: (-) cough, (-) shortness of breath  Gastrointestinal: (-) vomiting, (-) diarrhea, (-) abdominal pain  Musculoskeletal: (-) neck pain, (-) back pain, (-) joint pain  Integumentary: (+) rash, (-) edema, (-) wound  Neurological: (-) headache, (-) altered mental status    Except as documented in the HPI, all other systems are negative.

## 2022-06-04 LAB
ANION GAP SERPL CALC-SCNC: 10 MMOL/L — SIGNIFICANT CHANGE UP (ref 5–17)
APPEARANCE UR: CLEAR — SIGNIFICANT CHANGE UP
BACTERIA # UR AUTO: NEGATIVE — SIGNIFICANT CHANGE UP
BILIRUB UR-MCNC: NEGATIVE — SIGNIFICANT CHANGE UP
BUN SERPL-MCNC: 17 MG/DL — SIGNIFICANT CHANGE UP (ref 7–23)
CALCIUM SERPL-MCNC: 9.2 MG/DL — SIGNIFICANT CHANGE UP (ref 8.4–10.5)
CHLORIDE SERPL-SCNC: 106 MMOL/L — SIGNIFICANT CHANGE UP (ref 96–108)
CO2 SERPL-SCNC: 23 MMOL/L — SIGNIFICANT CHANGE UP (ref 22–31)
COLOR SPEC: SIGNIFICANT CHANGE UP
CREAT SERPL-MCNC: 0.76 MG/DL — SIGNIFICANT CHANGE UP (ref 0.5–1.3)
DIFF PNL FLD: ABNORMAL
EGFR: 77 ML/MIN/1.73M2 — SIGNIFICANT CHANGE UP
EPI CELLS # UR: 2 /HPF — SIGNIFICANT CHANGE UP
GLUCOSE SERPL-MCNC: 79 MG/DL — SIGNIFICANT CHANGE UP (ref 70–99)
GLUCOSE UR QL: NEGATIVE — SIGNIFICANT CHANGE UP
HCT VFR BLD CALC: 35.7 % — SIGNIFICANT CHANGE UP (ref 34.5–45)
HGB BLD-MCNC: 11.2 G/DL — LOW (ref 11.5–15.5)
HYALINE CASTS # UR AUTO: 0 /LPF — SIGNIFICANT CHANGE UP (ref 0–2)
KETONES UR-MCNC: SIGNIFICANT CHANGE UP
LEUKOCYTE ESTERASE UR-ACNC: ABNORMAL
MCHC RBC-ENTMCNC: 27.6 PG — SIGNIFICANT CHANGE UP (ref 27–34)
MCHC RBC-ENTMCNC: 31.4 GM/DL — LOW (ref 32–36)
MCV RBC AUTO: 87.9 FL — SIGNIFICANT CHANGE UP (ref 80–100)
NITRITE UR-MCNC: NEGATIVE — SIGNIFICANT CHANGE UP
NRBC # BLD: 0 /100 WBCS — SIGNIFICANT CHANGE UP (ref 0–0)
PH UR: 6 — SIGNIFICANT CHANGE UP (ref 5–8)
PLATELET # BLD AUTO: 317 K/UL — SIGNIFICANT CHANGE UP (ref 150–400)
POTASSIUM SERPL-MCNC: 3.9 MMOL/L — SIGNIFICANT CHANGE UP (ref 3.5–5.3)
POTASSIUM SERPL-SCNC: 3.9 MMOL/L — SIGNIFICANT CHANGE UP (ref 3.5–5.3)
PROT UR-MCNC: SIGNIFICANT CHANGE UP
RBC # BLD: 4.06 M/UL — SIGNIFICANT CHANGE UP (ref 3.8–5.2)
RBC # FLD: 16.2 % — HIGH (ref 10.3–14.5)
RBC CASTS # UR COMP ASSIST: 1 /HPF — SIGNIFICANT CHANGE UP (ref 0–4)
SODIUM SERPL-SCNC: 139 MMOL/L — SIGNIFICANT CHANGE UP (ref 135–145)
SP GR SPEC: 1.02 — SIGNIFICANT CHANGE UP (ref 1.01–1.02)
UROBILINOGEN FLD QL: NEGATIVE — SIGNIFICANT CHANGE UP
WBC # BLD: 10.04 K/UL — SIGNIFICANT CHANGE UP (ref 3.8–10.5)
WBC # FLD AUTO: 10.04 K/UL — SIGNIFICANT CHANGE UP (ref 3.8–10.5)
WBC UR QL: 4 /HPF — SIGNIFICANT CHANGE UP (ref 0–5)

## 2022-06-04 PROCEDURE — 93970 EXTREMITY STUDY: CPT | Mod: 26

## 2022-06-04 RX ORDER — DIPHENHYDRAMINE HCL 50 MG
25 CAPSULE ORAL ONCE
Refills: 0 | Status: COMPLETED | OUTPATIENT
Start: 2022-06-04 | End: 2022-06-04

## 2022-06-04 RX ORDER — CHLORHEXIDINE GLUCONATE 213 G/1000ML
1 SOLUTION TOPICAL DAILY
Refills: 0 | Status: DISCONTINUED | OUTPATIENT
Start: 2022-06-04 | End: 2022-06-13

## 2022-06-04 RX ORDER — SODIUM CHLORIDE 9 MG/ML
250 INJECTION INTRAMUSCULAR; INTRAVENOUS; SUBCUTANEOUS ONCE
Refills: 0 | Status: COMPLETED | OUTPATIENT
Start: 2022-06-04 | End: 2022-06-04

## 2022-06-04 RX ADMIN — Medication 100 MILLIGRAM(S): at 18:30

## 2022-06-04 RX ADMIN — CHLORHEXIDINE GLUCONATE 1 APPLICATION(S): 213 SOLUTION TOPICAL at 14:53

## 2022-06-04 RX ADMIN — Medication 12.5 MILLIGRAM(S): at 17:19

## 2022-06-04 RX ADMIN — Medication 100 MILLIGRAM(S): at 11:29

## 2022-06-04 RX ADMIN — Medication 25 MILLIGRAM(S): at 22:25

## 2022-06-04 RX ADMIN — Medication 3 MILLIGRAM(S): at 22:25

## 2022-06-04 RX ADMIN — Medication 12.5 MILLIGRAM(S): at 05:39

## 2022-06-04 RX ADMIN — ATORVASTATIN CALCIUM 20 MILLIGRAM(S): 80 TABLET, FILM COATED ORAL at 22:24

## 2022-06-04 RX ADMIN — Medication 25 MILLIGRAM(S): at 14:41

## 2022-06-04 RX ADMIN — ENOXAPARIN SODIUM 40 MILLIGRAM(S): 100 INJECTION SUBCUTANEOUS at 20:22

## 2022-06-04 RX ADMIN — Medication 1 APPLICATION(S): at 05:40

## 2022-06-04 RX ADMIN — Medication 25 MILLIGRAM(S): at 14:37

## 2022-06-04 RX ADMIN — Medication 15 GRAM(S): at 11:52

## 2022-06-04 RX ADMIN — Medication 1 APPLICATION(S): at 14:40

## 2022-06-04 RX ADMIN — Medication 1 APPLICATION(S): at 05:38

## 2022-06-04 RX ADMIN — Medication 1 APPLICATION(S): at 18:30

## 2022-06-04 RX ADMIN — Medication 1 MILLIGRAM(S): at 11:53

## 2022-06-04 RX ADMIN — Medication 50 MICROGRAM(S): at 05:38

## 2022-06-04 RX ADMIN — Medication 25 MILLIGRAM(S): at 05:38

## 2022-06-04 RX ADMIN — Medication 100 MILLIGRAM(S): at 03:11

## 2022-06-04 RX ADMIN — Medication 650 MILLIGRAM(S): at 23:30

## 2022-06-04 RX ADMIN — Medication 81 MILLIGRAM(S): at 11:51

## 2022-06-04 RX ADMIN — Medication 1 APPLICATION(S): at 22:30

## 2022-06-04 RX ADMIN — SODIUM CHLORIDE 750 MILLILITER(S): 9 INJECTION INTRAMUSCULAR; INTRAVENOUS; SUBCUTANEOUS at 11:25

## 2022-06-04 RX ADMIN — Medication 650 MILLIGRAM(S): at 22:27

## 2022-06-04 NOTE — PROGRESS NOTE ADULT - ASSESSMENT
General This is a 82 year old female with PMH CAD on aspirin, HTN, HLD, dermatitis who presented to the ED for b/l leg redness, swelling, pain that started all of a sudden around 3 days ago.

## 2022-06-04 NOTE — PROGRESS NOTE ADULT - SUBJECTIVE AND OBJECTIVE BOX
Patient is a 82y old  Female who presents with a chief complaint of LE cellulitis (2022 11:06)      SUBJECTIVE / OVERNIGHT EVENTS:    Events noted. Redness on LE  CONSTITUTIONAL: No fever,  or fatigue  RESPIRATORY: No cough, wheezing,  No shortness of breath  CARDIOVASCULAR: No chest pain, palpitations, dizziness, or leg swelling  GASTROINTESTINAL: No abdominal or epigastric pain. No nausea, vomiting.  NEUROLOGICAL: No headache    MEDICATIONS  (STANDING):  AQUAPHOR (petrolatum Ointment) 1 Application(s) Topical three times a day  aspirin enteric coated 81 milliGRAM(s) Oral daily  atorvastatin 20 milliGRAM(s) Oral at bedtime  ceFAZolin   IVPB      ceFAZolin   IVPB 2000 milliGRAM(s) IV Intermittent every 8 hours  chlorhexidine 2% Cloths 1 Application(s) Topical daily  enoxaparin Injectable 40 milliGRAM(s) SubCutaneous every 24 hours  folic acid 1 milliGRAM(s) Oral daily  hydrOXYzine hydrochloride 25 milliGRAM(s) Oral three times a day  levothyroxine 50 MICROGram(s) Oral daily  metoprolol tartrate 12.5 milliGRAM(s) Oral two times a day  urea Oral Powder 15 Gram(s) Oral daily    MEDICATIONS  (PRN):  acetaminophen     Tablet .. 650 milliGRAM(s) Oral every 6 hours PRN Temp greater or equal to 38C (100.4F), Mild Pain (1 - 3)  aluminum hydroxide/magnesium hydroxide/simethicone Suspension 30 milliLiter(s) Oral every 4 hours PRN Dyspepsia  melatonin 3 milliGRAM(s) Oral at bedtime PRN Insomnia  ondansetron Injectable 4 milliGRAM(s) IV Push every 8 hours PRN Nausea and/or Vomiting  triamcinolone 0.1% Ointment 1 Application(s) Topical two times a day PRN itching        CAPILLARY BLOOD GLUCOSE        I&O's Summary      T(C): 36.6 (22 @ 11:18), Max: 36.6 (22 @ 11:18)  HR: 74 (22 @ 13:34) (59 - 80)  BP: 121/62 (22 @ 13:34) (70/40 - 122/64)  RR: 18 (22 @ 11:18) (17 - 18)  SpO2: 98% (22 @ 11:18) (98% - 100%)    PHYSICAL EXAM:  GENERAL: NAD  NECK: Supple, No JVD  CHEST/LUNG: Clear to auscultation bilaterally; No wheezing.  HEART: Regular rate and rhythm; No murmurs, rubs, or gallops  ABDOMEN: Soft, Nontender, Nondistended; Bowel sounds present  EXTREMITIES:   Pedal edema/Erythema/Warm to touch  NEUROLOGY: AAO X 3      LABS:                        11.2   10.04 )-----------( 317      ( 2022 07:17 )             35.7         139  |  106  |  17  ----------------------------<  79  3.9   |  23  |  0.76    Ca    9.2      2022 07:19    TPro  7.8  /  Alb  3.2<L>  /  TBili  0.5  /  DBili  x   /  AST  73<H>  /  ALT  33  /  AlkPhos  57  06-03    PT/INR - ( 2022 14:15 )   PT: 13.9 sec;   INR: 1.20 ratio         PTT - ( 2022 14:15 )  PTT:33.6 sec      Urinalysis Basic - ( 2022 07:19 )    Color: Light Yellow / Appearance: Clear / S.018 / pH: x  Gluc: x / Ketone: Trace  / Bili: Negative / Urobili: Negative   Blood: x / Protein: Trace / Nitrite: Negative   Leuk Esterase: Large / RBC: 1 /hpf / WBC 4 /HPF   Sq Epi: x / Non Sq Epi: 2 /hpf / Bacteria: Negative      CAPILLARY BLOOD GLUCOSE            RADIOLOGY & ADDITIONAL TESTS:    Imaging Personally Reviewed:    Consultant(s) Notes Reviewed:      Care Discussed with Consultants/Other Providers:    Bill Alaniz MD, CMD, FACP    [id number] [id number]  Office Tel: [phone]  Cell: [phone]

## 2022-06-04 NOTE — CHART NOTE - NSCHARTNOTEFT_GEN_A_CORE
Dermatology Chart Note  HPI: This is a 82 year old female with PMH of CAD s/p CABG x3 in 2012 on aspirin, HTN, HLD, with 5y hx of dermatitis (typically managed by Dr Cummins at [place]) and venous stasis who presented to the ED for b/l leg redness, swelling and pain that started suddenly 3 days ago. Patient has also been experiencing chills, subjective fever, and worsening of her baseline dermatitis. Brother reports she was recommended to start dupixent at an outpatient visit. She has been using triamcinolone 0.1% cream BID and hydroxyzine 25mg q8h with minimal improvement. She saw her [facility] cardiologist saw the patient yesterday, referred her to a [facility] dermatologist who then sent her to the ED. She is admitted with c/f cellulitis. In the ER, afebrile, HD stable. Given vanc, zosyn, 1L LR. Xrays w/ diffuse soft tissue swelling. No tracking soft tissue gas collections, radiopaque foreign bodies, or gross radiographic evidence for osteomyelitis. Pt has been ancef 2gIV q8h on 06/03 and given Benadryl 25mg IV one-time.      PHYSICAL EXAM:  Based off of photos from primary team, skin exam notable for:  - thin eczematous ahd hyperpigmented plaques back of neck>upper arms> legs  - woody appearance to b/l lower legs      ASSESSMENT/PLAN:  #Stasis dermatitis on lower legs b/l  - If needed for itch: start triamcinolone 0.1% ointment BID to affected areas for up to 2 weeks on, then 1 week off. SED  - Elevate legs  - start Graduated compression stockings to b/l lower legs  - Fluid management per primary team.    #Chronic Dermatitis, flaring  At this time recommend:  - Apply triamcinolone 0.1% cream to affected areas up to twice daily PRN. Should not be used as moisturizer or over prolonged periods of time (>2 weeks) without breaks, as this can lead to unwanted side effects such as striae or atrophy.      Discussed with primary team.  Discussed with attending, Dr. Chucho Alvarez MD MPH  Resident Physician, PGY2  [facility]  Office: [phone]  Pager: [phone]   **Please page with 10-DIGIT callback # for further related questions.** Dermatology Chart Note  HPI: This is a 82 year old female with PMH of CAD s/p CABG x3 in 2012 on aspirin, HTN, HLD, with 5y hx of dermatitis (typically managed by Dr Cummins at [place]) and venous stasis who presented to the ED for b/l leg redness, swelling and pain that started suddenly 3 days ago. Patient has also been experiencing chills, subjective fever, and worsening of her baseline dermatitis. Brother reports she was recommended to start dupixent at an outpatient visit. She has been using triamcinolone 0.1% cream BID and hydroxyzine 25mg q8h with minimal improvement. She saw her [facility] cardiologist saw the patient yesterday, referred her to a [facility] dermatologist who then sent her to the ED. She is admitted with c/f cellulitis. In the ER, afebrile, HD stable. Given vanc, zosyn, 1L LR. Xrays w/ diffuse soft tissue swelling. No tracking soft tissue gas collections, radiopaque foreign bodies, or gross radiographic evidence for osteomyelitis. Pt has been ancef 2gIV q8h on 06/03 and given Benadryl 25mg IV one-time.      PHYSICAL EXAM:  Based off of photos from primary team, skin exam notable for:  - thin eczematous ahd hyperpigmented plaques back of neck>upper arms> legs  - woody appearance to b/l lower legs      ASSESSMENT/PLAN:  #Stasis dermatitis on lower legs b/l  - If needed for itch: start triamcinolone 0.1% ointment BID to affected areas for up to 2 weeks on, then 1 week off. SED  - Elevate legs  - start Graduated compression stockings to b/l lower legs  - Fluid management per primary team.    #Chronic Dermatitis, flaring  At this time recommend:  - Apply triamcinolone 0.1% ointment to affected areas up to twice daily PRN. Should not be used as moisturizer or over prolonged periods of time (>2 weeks) without breaks, as this can lead to unwanted side effects such as striae or atrophy.      Discussed with primary team.  Discussed with attending, Dr. Chucho Alvarez MD MPH  Resident Physician, PGY2  [facility]  Office: [phone]  Pager: [phone]   **Please page with 10-DIGIT callback # for further related questions.**

## 2022-06-04 NOTE — CONSULT NOTE ADULT - SUBJECTIVE AND OBJECTIVE BOX
Cardiovascular Disease Initial Evaluation    CHIEF COMPLAINT: Cellulitis    HISTORY OF PRESENT ILLNESS:  This is a 82 year old female with PMH CAD s/p CABG x3 in 2012 on aspirin, HTN, HLD, dermatitis who presented to the ED for b/l leg redness, swelling, pain that started all of a sudden around 3 days ago. Patient has also been experiencing chills, subjective fever, and worsening of her baseline dermatitis. She went to the dermatologist and was sent to the ED. No CP, SOB, abd pain, N/V, dysuria.  In the ER, afebrile, HD stable. Given vanc, zosyn, 1L LR. She denies chest pain or SOB at this time.       Allergies    No Known Allergies    Intolerances    	    MEDICATIONS:  aspirin enteric coated 81 milliGRAM(s) Oral daily  enoxaparin Injectable 40 milliGRAM(s) SubCutaneous every 24 hours  metoprolol tartrate 12.5 milliGRAM(s) Oral two times a day  urea Oral Powder 15 Gram(s) Oral daily    ceFAZolin   IVPB      ceFAZolin   IVPB 2000 milliGRAM(s) IV Intermittent every 8 hours      acetaminophen     Tablet .. 650 milliGRAM(s) Oral every 6 hours PRN  hydrOXYzine hydrochloride 25 milliGRAM(s) Oral three times a day  melatonin 3 milliGRAM(s) Oral at bedtime PRN  ondansetron Injectable 4 milliGRAM(s) IV Push every 8 hours PRN    aluminum hydroxide/magnesium hydroxide/simethicone Suspension 30 milliLiter(s) Oral every 4 hours PRN    atorvastatin 20 milliGRAM(s) Oral at bedtime  levothyroxine 50 MICROGram(s) Oral daily    AQUAPHOR (petrolatum Ointment) 1 Application(s) Topical three times a day  chlorhexidine 2% Cloths 1 Application(s) Topical daily  folic acid 1 milliGRAM(s) Oral daily  triamcinolone 0.1% Cream 1 Application(s) Topical two times a day      PAST MEDICAL & SURGICAL HISTORY:  CAD (coronary artery disease)      Stented coronary artery      Arthritis      S/P coronary artery stent placement in 9/2011          FAMILY HISTORY:  No pertinent family history in first degree relatives        SOCIAL HISTORY:    The patient is a nonsmoker       REVIEW OF SYSTEMS:  See HPI, otherwise complete 14 point review of systems negative    [ x] All others negative	  [ ] Unable to obtain    PHYSICAL EXAM:  T(C): 36.4 (06-04-22 @ 04:58), Max: 36.8 (06-03-22 @ 13:55)  HR: 80 (06-04-22 @ 04:58) (59 - 85)  BP: 122/64 (06-04-22 @ 04:58) (108/60 - 132/65)  RR: 18 (06-04-22 @ 04:58) (16 - 20)  SpO2: 100% (06-04-22 @ 04:58) (92% - 100%)  Wt(kg): --  I&O's Summary      Appearance: No Acute Distress; resting comfortably  HEENT:  Normal oral mucosa, PERRL, EOMI	  Cardiovascular: Normal S1 S2, No JVD, No murmurs/rubs/gallops  Respiratory: Normal respiratory effort; Lungs clear to auscultation bilaterally  Gastrointestinal:  Soft, Non-tender, + BS	  Skin: No rashes, No ecchymoses, No cyanosis	  Neurologic: Non-focal; no weakness  Extremities: No clubbing, cyanosis or edema  Vascular: Peripheral pulses palpable 2+ bilaterally  Psychiatry: A & O x 3, Mood & affect appropriate    Laboratory Data:	 	    CBC Full  -  ( 04 Jun 2022 07:17 )  WBC Count : 10.04 K/uL  Hemoglobin : 11.2 g/dL  Hematocrit : 35.7 %  Platelet Count - Automated : 317 K/uL  Mean Cell Volume : 87.9 fl  Mean Cell Hemoglobin : 27.6 pg  Mean Cell Hemoglobin Concentration : 31.4 gm/dL  Auto Neutrophil # : x  Auto Lymphocyte # : x  Auto Monocyte # : x  Auto Eosinophil # : x  Auto Basophil # : x  Auto Neutrophil % : x  Auto Lymphocyte % : x  Auto Monocyte % : x  Auto Eosinophil % : x  Auto Basophil % : x    06-04    139  |  106  |  17  ----------------------------<  79  3.9   |  23  |  0.76  06-03    138  |  105  |  19  ----------------------------<  109<H>  3.8   |  23  |  0.72    Ca    9.2      04 Jun 2022 07:19  Ca    8.6      03 Jun 2022 16:43    TPro  7.8  /  Alb  3.2<L>  /  TBili  0.5  /  DBili  x   /  AST  73<H>  /  ALT  33  /  AlkPhos  57  06-03      proBNP: Serum Pro-Brain Natriuretic Peptide: 950 pg/mL (06-03 @ 14:15)    Lipid Profile:   HgA1c:   TSH:       CARDIAC MARKERS:            Interpretation of Telemetry: 	    ECG:  	  RADIOLOGY:  OTHER: 	    PREVIOUS DIAGNOSTIC TESTING:    [ x] Echocardiogram: 2012: Nomal LV systolic function  [ ] Catheterization:  [ ] Stress Test:  	    Assessment:  82 year old female with PMH CAD s/p CABG x3 in 2012 on aspirin, HTN, HLD, dermatitis who presented to the ED for b/l leg redness, swelling, pain that started all of a sudden around 3 days ago    Plan of Care:    #CAD  - s/p CABG x3  - EKG shows sinus rhythm, with no acute ischemic changes  - TTE from 2012 normal   - BNP elevated however pt does not appear fluid overloaded  - Will obtain TTE for further analysis  - Continue ASA, BB and statin     #HTN  - BP acceptable  - Continue BB    #HLD  - Statin as ordered    #Cellulitis  - Management as per primary team    #ACP (advance care planning)-  Advanced care planning was discussed with the patient  Risks, benefits and alternatives of medical treatment and procedures were discussed in detail and all questions were answered. 30 minutes spent addressing advance care plans.        72 minutes spent on total encounter; more than 50% of the visit was spent counseling and/or coordinating care by the attending physician.   	  Adonis Farley D.O.   Cardiovascular Diseases  [phone]

## 2022-06-05 LAB
HCT VFR BLD CALC: 42.1 % — SIGNIFICANT CHANGE UP (ref 34.5–45)
HGB BLD-MCNC: 13.1 G/DL — SIGNIFICANT CHANGE UP (ref 11.5–15.5)
MCHC RBC-ENTMCNC: 28.1 PG — SIGNIFICANT CHANGE UP (ref 27–34)
MCHC RBC-ENTMCNC: 31.1 GM/DL — LOW (ref 32–36)
MCV RBC AUTO: 90.3 FL — SIGNIFICANT CHANGE UP (ref 80–100)
MRSA PCR RESULT.: SIGNIFICANT CHANGE UP
NRBC # BLD: 0 /100 WBCS — SIGNIFICANT CHANGE UP (ref 0–0)
PLATELET # BLD AUTO: 348 K/UL — SIGNIFICANT CHANGE UP (ref 150–400)
RBC # BLD: 4.66 M/UL — SIGNIFICANT CHANGE UP (ref 3.8–5.2)
RBC # FLD: 15.9 % — HIGH (ref 10.3–14.5)
S AUREUS DNA NOSE QL NAA+PROBE: DETECTED
WBC # BLD: 10 K/UL — SIGNIFICANT CHANGE UP (ref 3.8–10.5)
WBC # FLD AUTO: 10 K/UL — SIGNIFICANT CHANGE UP (ref 3.8–10.5)

## 2022-06-05 PROCEDURE — 99223 1ST HOSP IP/OBS HIGH 75: CPT

## 2022-06-05 RX ORDER — DIPHENHYDRAMINE HCL 50 MG
25 CAPSULE ORAL EVERY 4 HOURS
Refills: 0 | Status: DISCONTINUED | OUTPATIENT
Start: 2022-06-05 | End: 2022-06-13

## 2022-06-05 RX ORDER — FUROSEMIDE 40 MG
20 TABLET ORAL ONCE
Refills: 0 | Status: COMPLETED | OUTPATIENT
Start: 2022-06-06 | End: 2022-06-05

## 2022-06-05 RX ADMIN — Medication 81 MILLIGRAM(S): at 13:37

## 2022-06-05 RX ADMIN — Medication 1 MILLIGRAM(S): at 13:37

## 2022-06-05 RX ADMIN — ATORVASTATIN CALCIUM 20 MILLIGRAM(S): 80 TABLET, FILM COATED ORAL at 21:08

## 2022-06-05 RX ADMIN — Medication 12.5 MILLIGRAM(S): at 17:22

## 2022-06-05 RX ADMIN — Medication 100 MILLIGRAM(S): at 02:28

## 2022-06-05 RX ADMIN — Medication 25 MILLIGRAM(S): at 19:50

## 2022-06-05 RX ADMIN — Medication 25 MILLIGRAM(S): at 13:37

## 2022-06-05 RX ADMIN — Medication 1 APPLICATION(S): at 13:38

## 2022-06-05 RX ADMIN — Medication 1 APPLICATION(S): at 16:00

## 2022-06-05 RX ADMIN — Medication 20 MILLIGRAM(S): at 23:59

## 2022-06-05 RX ADMIN — Medication 50 MICROGRAM(S): at 05:56

## 2022-06-05 RX ADMIN — CHLORHEXIDINE GLUCONATE 1 APPLICATION(S): 213 SOLUTION TOPICAL at 16:23

## 2022-06-05 RX ADMIN — Medication 1 APPLICATION(S): at 05:57

## 2022-06-05 RX ADMIN — Medication 25 MILLIGRAM(S): at 05:55

## 2022-06-05 RX ADMIN — ENOXAPARIN SODIUM 40 MILLIGRAM(S): 100 INJECTION SUBCUTANEOUS at 19:50

## 2022-06-05 RX ADMIN — Medication 25 MILLIGRAM(S): at 21:09

## 2022-06-05 RX ADMIN — Medication 15 GRAM(S): at 13:37

## 2022-06-05 NOTE — PROGRESS NOTE ADULT - SUBJECTIVE AND OBJECTIVE BOX
Patient is a 82y old  Female who presents with a chief complaint of LE cellulitis (2022 10:12)      SUBJECTIVE / OVERNIGHT EVENTS:    Events noted.  CONSTITUTIONAL: No fever,  or fatigue  RESPIRATORY: No cough, wheezing,  No shortness of breath  CARDIOVASCULAR: No chest pain, palpitations, dizziness, or leg swelling  GASTROINTESTINAL: No abdominal or epigastric pain.       MEDICATIONS  (STANDING):  AQUAPHOR (petrolatum Ointment) 1 Application(s) Topical three times a day  aspirin enteric coated 81 milliGRAM(s) Oral daily  atorvastatin 20 milliGRAM(s) Oral at bedtime  chlorhexidine 2% Cloths 1 Application(s) Topical daily  enoxaparin Injectable 40 milliGRAM(s) SubCutaneous every 24 hours  folic acid 1 milliGRAM(s) Oral daily  hydrOXYzine hydrochloride 25 milliGRAM(s) Oral three times a day  levothyroxine 50 MICROGram(s) Oral daily  metoprolol tartrate 12.5 milliGRAM(s) Oral two times a day  urea Oral Powder 15 Gram(s) Oral daily    MEDICATIONS  (PRN):  acetaminophen     Tablet .. 650 milliGRAM(s) Oral every 6 hours PRN Temp greater or equal to 38C (100.4F), Mild Pain (1 - 3)  aluminum hydroxide/magnesium hydroxide/simethicone Suspension 30 milliLiter(s) Oral every 4 hours PRN Dyspepsia  diphenhydrAMINE 25 milliGRAM(s) Oral every 4 hours PRN Rash and/or Itching  melatonin 3 milliGRAM(s) Oral at bedtime PRN Insomnia  ondansetron Injectable 4 milliGRAM(s) IV Push every 8 hours PRN Nausea and/or Vomiting  triamcinolone 0.1% Ointment 1 Application(s) Topical two times a day PRN itching        CAPILLARY BLOOD GLUCOSE        I&O's Summary      T(C): 36.4 (22 @ 20:28), Max: 36.7 (22 @ 04:58)  HR: 74 (22 @ 20:28) (70 - 79)  BP: 114/62 (22 @ 20:28) (103/58 - 114/62)  RR: 18 (22 @ 20:28) (18 - 18)  SpO2: 97% (06-05-22 @ 20:28) (97% - 99%)    PHYSICAL EXAM:  GENERAL: NAD  NECK: Supple, No JVD  CHEST/LUNG: Clear to auscultation bilaterally; No wheezing.  HEART: Regular rate and rhythm; No murmurs, rubs, or gallops  ABDOMEN: Soft, Nontender, Nondistended; Bowel sounds present  EXTREMITIES:   BL LE redness/Pedal edema  NEUROLOGY: AAO X 3      LABS:                        13.1   10.00 )-----------( 348      ( 2022 07:33 )             42.1     06-04    139  |  106  |  17  ----------------------------<  79  3.9   |  23  |  0.76    Ca    9.2      2022 07:19            Urinalysis Basic - ( 2022 07:19 )    Color: Light Yellow / Appearance: Clear / S.018 / pH: x  Gluc: x / Ketone: Trace  / Bili: Negative / Urobili: Negative   Blood: x / Protein: Trace / Nitrite: Negative   Leuk Esterase: Large / RBC: 1 /hpf / WBC 4 /HPF   Sq Epi: x / Non Sq Epi: 2 /hpf / Bacteria: Negative      CAPILLARY BLOOD GLUCOSE            RADIOLOGY & ADDITIONAL TESTS:    Imaging Personally Reviewed:    Consultant(s) Notes Reviewed:      Care Discussed with Consultants/Other Providers:    Bill Alaniz MD, CMD, FACP    [address] [place] [address]  Office Tel: [phone]  Cell: [phone]

## 2022-06-05 NOTE — CONSULT NOTE ADULT - SUBJECTIVE AND OBJECTIVE BOX
"HPI:  This is a 82 year old female with PMH CAD on aspirin, HTN, HLD, dermatitis who presented to the ED for b/l leg redness, swelling, pain that started all of a sudden around 3 days ago. Patient has also been experiencing chills, subjective fever, and worsening of her baseline dermatitis. She went to the dermatologist today and was sent to the ED. No CP, SOB, abd pain, N/V, dysuria.  In the ER, afebrile, HD stable. Given vanc, zosyn, 1L LR.  (2022 17:59)"    Above reviewed. 83 yo F CAD, HTN, HLD, LE redness bilaterally. Increasing edema LE. Longstanding dermatitis. Has had chills and subjective fever. Today, with bilateral redness on eval, pain improved. No purulence, no discharge. ID called for further eval.    PAST MEDICAL & SURGICAL HISTORY:  CAD (coronary artery disease)    Stented coronary artery    Arthritis    S/P coronary artery stent placement in 2011    Allergies    No Known Allergies    Intolerances    ANTIMICROBIALS:  ceFAZolin   IVPB    ceFAZolin   IVPB 2000 every 8 hours    OTHER MEDS:  acetaminophen     Tablet .. 650 milliGRAM(s) Oral every 6 hours PRN  aluminum hydroxide/magnesium hydroxide/simethicone Suspension 30 milliLiter(s) Oral every 4 hours PRN  AQUAPHOR (petrolatum Ointment) 1 Application(s) Topical three times a day  aspirin enteric coated 81 milliGRAM(s) Oral daily  atorvastatin 20 milliGRAM(s) Oral at bedtime  chlorhexidine 2% Cloths 1 Application(s) Topical daily  enoxaparin Injectable 40 milliGRAM(s) SubCutaneous every 24 hours  folic acid 1 milliGRAM(s) Oral daily  hydrOXYzine hydrochloride 25 milliGRAM(s) Oral three times a day  levothyroxine 50 MICROGram(s) Oral daily  melatonin 3 milliGRAM(s) Oral at bedtime PRN  metoprolol tartrate 12.5 milliGRAM(s) Oral two times a day  ondansetron Injectable 4 milliGRAM(s) IV Push every 8 hours PRN  triamcinolone 0.1% Ointment 1 Application(s) Topical two times a day PRN  urea Oral Powder 15 Gram(s) Oral daily    SOCIAL HISTORY: No tobacco, no alcohol, no illicit drugs    FAMILY HISTORY:  No pertinent family history in first degree relatives relating to chief complaint    Drug Dosing Weight  Height (cm): 152.4 (2022 11:38)  Weight (kg): 48.5 (2022 11:38)  BMI (kg/m2): 20.9 (2022 11:38)  BSA (m2): 1.43 (2022 11:38)    PE:    Vital Signs Last 24 Hrs  T(C): 36.7 (2022 04:58), Max: 36.7 (2022 19:50)  T(F): 98 (2022 04:58), Max: 98.1 (2022 19:50)  HR: 70 (2022 04:58) (70 - 74)  BP: 103/58 (2022 04:58) (70/40 - 121/62)  RR: 18 (2022 04:58) (18 - 18)  SpO2: 99% (2022 04:58) (96% - 99%)    Gen: AOx3, NAD, non-toxic, pleasant  CV: S1+S2 normal, nontachycardic  Resp: Clear bilat, no resp distress, no crackles/wheezes  Abd: Soft, nontender, +BS  Ext: Bilateral redness in shins in setting of chronic changes and edema--CVS > cellulitis  : No Juárez  IV/Skin: No thrombophlebitis  Msk: No low back pain, no arthralgias, no joint swelling  Neuro: No sensory deficits, no motor deficits    LABS:                        13.1   10.00 )-----------( 348      ( 2022 07:33 )             42.1     06-04    139  |  106  |  17  ----------------------------<  79  3.9   |  23  |  0.76    Ca    9.2      2022 07:19    TPro  7.8  /  Alb  3.2<L>  /  TBili  0.5  /  DBili  x   /  AST  73<H>  /  ALT  33  /  AlkPhos  57  06-03    Urinalysis Basic - ( 2022 07:19 )    Color: Light Yellow / Appearance: Clear / S.018 / pH: x  Gluc: x / Ketone: Trace  / Bili: Negative / Urobili: Negative   Blood: x / Protein: Trace / Nitrite: Negative   Leuk Esterase: Large / RBC: 1 /hpf / WBC 4 /HPF   Sq Epi: x / Non Sq Epi: 2 /hpf / Bacteria: Negative    MICROBIOLOGY:    .Blood Blood-Peripheral  22   No growth to date.  --  --    .Blood Blood-Peripheral  22   No growth to date.  --  --    Rapid RVP Result: NotDetec ( @ 14:14)    (otherwise reviewed)    RADIOLOGY:     USG:    IMPRESSION:  No evidence of deep venous thrombosis in either lower extremity.  Subcutaneous edema is visualized in the popliteal regions and calves   bilaterally.

## 2022-06-05 NOTE — PHYSICAL THERAPY INITIAL EVALUATION ADULT - GAIT DISTANCE, PT EVAL
10 ft with no assistive device, very unsteady, reaching for objects, grabbing PTs arm. amb with RW improved gait but pt lets of of RW at times/50 feet

## 2022-06-05 NOTE — PHYSICAL THERAPY INITIAL EVALUATION ADULT - ADDITIONAL COMMENTS
Pt lives alone in an apartment, 3-4 steps to enter building, elevator accessible. PTA pt was (I) with ADLs and functional mobility. Denies use of RW/cane, stated "I use my cart sometimes". presumably a shopping cart ?

## 2022-06-05 NOTE — PROGRESS NOTE ADULT - ASSESSMENT
This is a 82 year old female with PMH CAD on aspirin, HTN, HLD, dermatitis who presented to the ED for b/l leg redness, swelling, pain that started all of a sudden around 3 days ago.     Pedal edema:  IV Lasix 20 mg IV x 1 dose  ECHO  Cardio f/up noted

## 2022-06-05 NOTE — PHYSICAL THERAPY INITIAL EVALUATION ADULT - STAIR PATTERN, REHAB EVAL
dec quad strength, relies heavily on BUEs to pull self up stair. unable to perform more than 1 step at this time/step over step

## 2022-06-05 NOTE — PROGRESS NOTE ADULT - SUBJECTIVE AND OBJECTIVE BOX
Cardiovascular Disease Progress Note    Overnight events: No acute events overnight.  Pt denies chest pain or SOB.   Otherwise review of systems negative    Objective Findings:  T(C): 36.7 (06-05-22 @ 04:58), Max: 36.7 (06-04-22 @ 19:50)  HR: 70 (06-05-22 @ 04:58) (70 - 74)  BP: 103/58 (06-05-22 @ 04:58) (70/40 - 121/62)  RR: 18 (06-05-22 @ 04:58) (18 - 18)  SpO2: 99% (06-05-22 @ 04:58) (96% - 99%)  Wt(kg): --  Daily     Daily       Physical Exam:  Gen: NAD; Patient resting comfortably  HEENT: EOMI, Normocephalic/ atraumatic  CV: RRR, normal S1 + S2, no m/r/g  Lungs:  Normal respiratory effort; clear to auscultation bilaterally  Abd: soft, non-tender; bowel sounds present  Ext: Trace edema in LE bilaterally.     Telemetry: N/a    Laboratory Data:                        13.1   10.00 )-----------( 348      ( 05 Jun 2022 07:33 )             42.1     06-04    139  |  106  |  17  ----------------------------<  79  3.9   |  23  |  0.76    Ca    9.2      04 Jun 2022 07:19    TPro  7.8  /  Alb  3.2<L>  /  TBili  0.5  /  DBili  x   /  AST  73<H>  /  ALT  33  /  AlkPhos  57  06-03    PT/INR - ( 03 Jun 2022 14:15 )   PT: 13.9 sec;   INR: 1.20 ratio         PTT - ( 03 Jun 2022 14:15 )  PTT:33.6 sec          Inpatient Medications:  MEDICATIONS  (STANDING):  AQUAPHOR (petrolatum Ointment) 1 Application(s) Topical three times a day  aspirin enteric coated 81 milliGRAM(s) Oral daily  atorvastatin 20 milliGRAM(s) Oral at bedtime  ceFAZolin   IVPB      ceFAZolin   IVPB 2000 milliGRAM(s) IV Intermittent every 8 hours  chlorhexidine 2% Cloths 1 Application(s) Topical daily  enoxaparin Injectable 40 milliGRAM(s) SubCutaneous every 24 hours  folic acid 1 milliGRAM(s) Oral daily  hydrOXYzine hydrochloride 25 milliGRAM(s) Oral three times a day  levothyroxine 50 MICROGram(s) Oral daily  metoprolol tartrate 12.5 milliGRAM(s) Oral two times a day  urea Oral Powder 15 Gram(s) Oral daily      Assessment:  82 year old female with PMH CAD s/p CABG x3 in 2012 on aspirin, HTN, HLD, dermatitis who presented to the ED for b/l leg redness, swelling, pain that started all of a sudden around 3 days ago    Plan of Care:    #CAD  - s/p CABG x3  - EKG shows sinus rhythm, with no acute ischemic changes  - Pt states she saw her private cardiologist 2 days prior to admission and no changes were made to her medications.   - TTE from 2012 normal   - BNP elevated however pt does not appear fluid overloaded  - Will obtain TTE for further analysis  - Continue ASA, BB and statin     #HTN  - BP acceptable  - Continue BB    #HLD  - Statin as ordered    #Cellulitis  - Management as per primary team  - US negative for DVT        Over 25 minutes spent on total encounter; more than 50% of the visit was spent counseling and/or coordinating care by the attending physician.      Adonis Farley D.O.   Cardiovascular Disease  [phone]

## 2022-06-05 NOTE — CONSULT NOTE ADULT - ASSESSMENT
83 yo F CAD, HTN, HLD, LE redness bilaterally. Increasing edema LE. Longstanding dermatitis.   No fever, no leukocytosis, note eosinophilia  Known to have longstanding dermatitis--per derm currently in flare  LE edema and redness bilaterally with no purulence--lower suspicion for cellulitis, favor CVS  Otherwise only other symptoms is itching of skin in setting of dermatitis  Overall,  1) LE Redness/edema  - Suspect CVS, lower suspicion cellulitis  - DC Cefazolin, monitor off abx  - Note dermatology input regarding symptomatic care  - Monitor site for signs developing cellulitis  2) Eosinophilia  - Trend to normal  - Related to underlying dermatitis? Allergic?  - Monitor eosinophils  3) CVS  - Compression stockings  - Evaluation for other organ system causes of LE edema per team    Venkat Perez MD  Contact on TEAMS messaging from 9am - 5pm  From 5pm-9am, and on weekends call [phone]

## 2022-06-06 LAB
BASOPHILS # BLD AUTO: 0.06 K/UL — SIGNIFICANT CHANGE UP (ref 0–0.2)
BASOPHILS NFR BLD AUTO: 0.6 % — SIGNIFICANT CHANGE UP (ref 0–2)
EOSINOPHIL # BLD AUTO: 3.16 K/UL — HIGH (ref 0–0.5)
EOSINOPHIL NFR BLD AUTO: 33.6 % — HIGH (ref 0–6)
HCT VFR BLD CALC: 35.6 % — SIGNIFICANT CHANGE UP (ref 34.5–45)
HGB BLD-MCNC: 11.3 G/DL — LOW (ref 11.5–15.5)
IMM GRANULOCYTES NFR BLD AUTO: 0.4 % — SIGNIFICANT CHANGE UP (ref 0–1.5)
LYMPHOCYTES # BLD AUTO: 1.56 K/UL — SIGNIFICANT CHANGE UP (ref 1–3.3)
LYMPHOCYTES # BLD AUTO: 16.6 % — SIGNIFICANT CHANGE UP (ref 13–44)
MCHC RBC-ENTMCNC: 27.8 PG — SIGNIFICANT CHANGE UP (ref 27–34)
MCHC RBC-ENTMCNC: 31.7 GM/DL — LOW (ref 32–36)
MCV RBC AUTO: 87.7 FL — SIGNIFICANT CHANGE UP (ref 80–100)
MONOCYTES # BLD AUTO: 0.69 K/UL — SIGNIFICANT CHANGE UP (ref 0–0.9)
MONOCYTES NFR BLD AUTO: 7.3 % — SIGNIFICANT CHANGE UP (ref 2–14)
NEUTROPHILS # BLD AUTO: 3.89 K/UL — SIGNIFICANT CHANGE UP (ref 1.8–7.4)
NEUTROPHILS NFR BLD AUTO: 41.5 % — LOW (ref 43–77)
NRBC # BLD: 0 /100 WBCS — SIGNIFICANT CHANGE UP (ref 0–0)
PLATELET # BLD AUTO: 295 K/UL — SIGNIFICANT CHANGE UP (ref 150–400)
RBC # BLD: 4.06 M/UL — SIGNIFICANT CHANGE UP (ref 3.8–5.2)
RBC # FLD: 16.1 % — HIGH (ref 10.3–14.5)
WBC # BLD: 9.4 K/UL — SIGNIFICANT CHANGE UP (ref 3.8–10.5)
WBC # FLD AUTO: 9.4 K/UL — SIGNIFICANT CHANGE UP (ref 3.8–10.5)

## 2022-06-06 PROCEDURE — 99232 SBSQ HOSP IP/OBS MODERATE 35: CPT

## 2022-06-06 PROCEDURE — 71045 X-RAY EXAM CHEST 1 VIEW: CPT | Mod: 26

## 2022-06-06 PROCEDURE — 93306 TTE W/DOPPLER COMPLETE: CPT | Mod: 26

## 2022-06-06 PROCEDURE — 99223 1ST HOSP IP/OBS HIGH 75: CPT | Mod: GC

## 2022-06-06 RX ORDER — FUROSEMIDE 40 MG
20 TABLET ORAL ONCE
Refills: 0 | Status: DISCONTINUED | OUTPATIENT
Start: 2022-06-06 | End: 2022-06-13

## 2022-06-06 RX ORDER — BENZOCAINE AND MENTHOL 5; 1 G/100ML; G/100ML
1 LIQUID ORAL
Refills: 0 | Status: DISCONTINUED | OUTPATIENT
Start: 2022-06-06 | End: 2022-06-13

## 2022-06-06 RX ADMIN — Medication 1 APPLICATION(S): at 05:08

## 2022-06-06 RX ADMIN — Medication 25 MILLIGRAM(S): at 22:07

## 2022-06-06 RX ADMIN — Medication 81 MILLIGRAM(S): at 13:17

## 2022-06-06 RX ADMIN — Medication 12.5 MILLIGRAM(S): at 05:08

## 2022-06-06 RX ADMIN — Medication 25 MILLIGRAM(S): at 20:19

## 2022-06-06 RX ADMIN — Medication 1 APPLICATION(S): at 22:03

## 2022-06-06 RX ADMIN — Medication 50 MICROGRAM(S): at 05:08

## 2022-06-06 RX ADMIN — Medication 15 GRAM(S): at 13:17

## 2022-06-06 RX ADMIN — Medication 25 MILLIGRAM(S): at 05:08

## 2022-06-06 RX ADMIN — Medication 12.5 MILLIGRAM(S): at 17:08

## 2022-06-06 RX ADMIN — Medication 1 MILLIGRAM(S): at 13:17

## 2022-06-06 RX ADMIN — CHLORHEXIDINE GLUCONATE 1 APPLICATION(S): 213 SOLUTION TOPICAL at 13:21

## 2022-06-06 RX ADMIN — ENOXAPARIN SODIUM 40 MILLIGRAM(S): 100 INJECTION SUBCUTANEOUS at 18:53

## 2022-06-06 RX ADMIN — Medication 25 MILLIGRAM(S): at 13:18

## 2022-06-06 RX ADMIN — Medication 1 APPLICATION(S): at 13:18

## 2022-06-06 RX ADMIN — ATORVASTATIN CALCIUM 20 MILLIGRAM(S): 80 TABLET, FILM COATED ORAL at 22:07

## 2022-06-06 RX ADMIN — BENZOCAINE AND MENTHOL 1 LOZENGE: 5; 1 LIQUID ORAL at 13:40

## 2022-06-06 NOTE — PROGRESS NOTE ADULT - SUBJECTIVE AND OBJECTIVE BOX
Patient is a 82y old  Female who presents with a chief complaint of LE cellulitis (2022 10:12)      SUBJECTIVE / OVERNIGHT EVENTS:    Events noted.  CONSTITUTIONAL: No fever,  or fatigue  RESPIRATORY: No cough, wheezing,  No shortness of breath  CARDIOVASCULAR: No chest pain, palpitations, dizziness, or leg swelling  GASTROINTESTINAL: No abdominal or epigastric pain.       MEDICATIONS  (STANDING):  AQUAPHOR (petrolatum Ointment) 1 Application(s) Topical three times a day  aspirin enteric coated 81 milliGRAM(s) Oral daily  atorvastatin 20 milliGRAM(s) Oral at bedtime  chlorhexidine 2% Cloths 1 Application(s) Topical daily  enoxaparin Injectable 40 milliGRAM(s) SubCutaneous every 24 hours  folic acid 1 milliGRAM(s) Oral daily  hydrOXYzine hydrochloride 25 milliGRAM(s) Oral three times a day  levothyroxine 50 MICROGram(s) Oral daily  metoprolol tartrate 12.5 milliGRAM(s) Oral two times a day  urea Oral Powder 15 Gram(s) Oral daily    MEDICATIONS  (PRN):  acetaminophen     Tablet .. 650 milliGRAM(s) Oral every 6 hours PRN Temp greater or equal to 38C (100.4F), Mild Pain (1 - 3)  aluminum hydroxide/magnesium hydroxide/simethicone Suspension 30 milliLiter(s) Oral every 4 hours PRN Dyspepsia  diphenhydrAMINE 25 milliGRAM(s) Oral every 4 hours PRN Rash and/or Itching  melatonin 3 milliGRAM(s) Oral at bedtime PRN Insomnia  ondansetron Injectable 4 milliGRAM(s) IV Push every 8 hours PRN Nausea and/or Vomiting  triamcinolone 0.1% Ointment 1 Application(s) Topical two times a day PRN itching        CAPILLARY BLOOD GLUCOSE        I&O's Summary      T(C): 36.4 (22 @ 20:28), Max: 36.7 (22 @ 04:58)  HR: 74 (22 @ 20:28) (70 - 79)  BP: 114/62 (22 @ 20:28) (103/58 - 114/62)  RR: 18 (22 @ 20:28) (18 - 18)  SpO2: 97% (06-05-22 @ 20:28) (97% - 99%)    PHYSICAL EXAM:  GENERAL: NAD  NECK: Supple, No JVD  CHEST/LUNG: Clear to auscultation bilaterally; No wheezing.  HEART: Regular rate and rhythm; No murmurs, rubs, or gallops  ABDOMEN: Soft, Nontender, Nondistended; Bowel sounds present  EXTREMITIES:   BL LE redness/Pedal edema  NEUROLOGY: AAO X 3      LABS:                        13.1   10.00 )-----------( 348      ( 2022 07:33 )             42.1     06-04    139  |  106  |  17  ----------------------------<  79  3.9   |  23  |  0.76    Ca    9.2      2022 07:19            Urinalysis Basic - ( 2022 07:19 )    Color: Light Yellow / Appearance: Clear / S.018 / pH: x  Gluc: x / Ketone: Trace  / Bili: Negative / Urobili: Negative   Blood: x / Protein: Trace / Nitrite: Negative   Leuk Esterase: Large / RBC: 1 /hpf / WBC 4 /HPF   Sq Epi: x / Non Sq Epi: 2 /hpf / Bacteria: Negative      CAPILLARY BLOOD GLUCOSE            RADIOLOGY & ADDITIONAL TESTS:    Imaging Personally Reviewed:    Consultant(s) Notes Reviewed:      Care Discussed with Consultants/Other Providers:    Bill Alaniz MD, CMD, FACP    [address] [place] [address]  Office Tel: [phone]  Cell: [phone]

## 2022-06-06 NOTE — CONSULT NOTE ADULT - ASSESSMENT
==incomplete==      #Stasis dermatitis on lower legs b/l  - If needed for itch: start triamcinolone 0.1% ointment BID to affected areas for up to 2 weeks on, then 1 week off. SED  - Elevate legs  - start Graduated compression stockings to b/l lower legs  - Fluid management per primary team.    #Chronic Dermatitis, flaring  At this time recommend:  - Apply triamcinolone 0.1% ointment to affected areas up to twice daily PRN. Should not be used as moisturizer or over prolonged periods of time (>2 weeks) without breaks, as this can lead to unwanted side effects such as striae or atrophy.     Erythroderma (diffuse redness of the skin affecting more than 90% of the body surface) likely 2/2 eczema  - Ddx: The more common causes of erythroderma in adults are psoriasis, atopic dermatitis, cutaneous T-cell lymphoma (CTCL), pityriasis rubra pilaris, and drug exanthems. Eosinophilia is also c/w atopic dermatitis but is nonspecific. No recent drug to suggest a drug-induced erythroderma.  - Continue triamcinolone 0.1% ointment BID to affected areas on face for up to 2 weeks. Avoid use around eyes and mouth.  - Moisturize face and body w/ plain Vaseline twice daily after applying topical steroids.  - Check daily CBC w/ diff and CMP daily  - Recommend outpatient initiation of dupxient    #Stasis dermatitis on lower legs b/l  - If needed for itch: start triamcinolone 0.1% ointment BID to affected areas for up to 2 weeks on, then 1 week off. SED  - Elevate legs  - start Graduated compression stockings to b/l lower legs  - Fluid management per primary team.    Thank you for allowing us to participate in the care of this patient.  Please re-consult Dermatology for any new or worsening developments.    Patient can follow up at our outpatient Dermatology office within 2 week(s)  [address], [address], [facility]  [phone]    The patient's chart was reviewed in addition to being seen and examined at bedside with the dermatology attending Dr. Gonzalez.  Recommendations were communicated with the primary team.  Please page [phone] with a 10-digit call-back number for further related questions.    Lev Pelayo MD  Resident Physician, PGY-2  [facility] Dermatology  Pager: [phone]  Office: [phone]   Erythroderma (diffuse redness of the skin affecting more than 90% of the body surface) likely 2/2 eczema  - Ddx: The more common causes of erythroderma in adults are psoriasis, atopic dermatitis, cutaneous T-cell lymphoma (CTCL), pityriasis rubra pilaris, and drug exanthems. Eosinophilia is also c/w atopic dermatitis but is nonspecific. No recent drug to suggest a drug-induced erythroderma.  - Continue triamcinolone 0.1% ointment BID to affected areas on face for up to 2 weeks. Avoid use around eyes and mouth.  - Moisturize face and body w/ plain Vaseline twice daily after applying topical steroids.  - Check daily CBC w/ diff and CMP daily  - Recommend outpatient initiation of dupxient  - No dermatologic reason to keep inpatient - signing off. Patient can f/u as outpatient    #Stasis dermatitis on lower legs b/l  - If needed for itch: start triamcinolone 0.1% ointment BID to affected areas for up to 2 weeks on, then 1 week off. SED  - Elevate legs  - start Graduated compression stockings to b/l lower legs  - Fluid management per primary team.                Thank you for allowing us to participate in the care of this patient.  Please re-consult Dermatology for any new or worsening developments.    Patient can follow up at our outpatient Dermatology office within 2 week(s)  [address], [address], [facility]  [phone]    The patient's chart was reviewed in addition to being seen and examined at bedside with the dermatology attending Dr. Gonzalez.  Recommendations were communicated with the primary team.  Please page [phone] with a 10-digit call-back number for further related questions.    Lev Pelayo MD  Resident Physician, PGY-2  [facility] Dermatology  Pager: [phone]  Office: [phone]   Erythroderma (diffuse redness of the skin affecting more than 90% of the body surface) likely 2/2 eczema  - Ddx: The more common causes of erythroderma in adults are psoriasis, atopic dermatitis, cutaneous T-cell lymphoma (CTCL), pityriasis rubra pilaris, and drug exanthems. Eosinophilia is also c/w atopic dermatitis but is nonspecific. No recent drug to suggest a drug-induced erythroderma.  - Continue triamcinolone 0.1% ointment BID to affected areas on face for up to 2 weeks. Avoid use around eyes and mouth.  - Moisturize face and body w/ plain Vaseline twice daily after applying topical steroids.  - Will consider outpatient initiation of dupxient  - No dermatologic reason to keep inpatient - signing off. Patient can f/u as outpatient    #Stasis dermatitis on lower legs b/l  - If needed for itch: start triamcinolone 0.1% ointment BID to affected areas for up to 2 weeks on, then 1 week off. SED  - Elevate legs  - start Graduated compression stockings to b/l lower legs  - Fluid management per primary team.                Thank you for allowing us to participate in the care of this patient.  Please re-consult Dermatology for any new or worsening developments.    Patient can follow up at our outpatient Dermatology office within 2 week(s)  [address], [address], [facility]  [phone]    The patient's chart was reviewed in addition to being seen and examined at bedside with the dermatology attending Dr. Gonzalez.  Recommendations were communicated with the primary team.  Please page [phone] with a 10-digit call-back number for further related questions.    Lev Pelayo MD  Resident Physician, PGY-2  [facility] Dermatology  Pager: [phone]  Office: [phone]

## 2022-06-06 NOTE — PROGRESS NOTE ADULT - SUBJECTIVE AND OBJECTIVE BOX
CC: F/U for LE redness    Saw/spoke to patient. Unchanged. Doing well.    Allergies  No Known Allergies    ANTIMICROBIALS:  Off    PE:    Vital Signs Last 24 Hrs  T(C): 36.8 (06 Jun 2022 12:00), Max: 36.8 (06 Jun 2022 12:00)  T(F): 98.2 (06 Jun 2022 12:00), Max: 98.2 (06 Jun 2022 12:00)  HR: 70 (06 Jun 2022 12:00) (70 - 79)  BP: 115/58 (06 Jun 2022 12:00) (111/65 - 115/58)  RR: 18 (06 Jun 2022 12:00) (18 - 18)  SpO2: 97% (06 Jun 2022 12:00) (97% - 97%)    Gen: AOx3, NAD, non-toxic  Resp: Breathing comfortably, RA  Ext: LE swelling and redness decreased bilaterally    LABS:                        11.3   9.40  )-----------( 295      ( 06 Jun 2022 06:36 )             35.6     MICROBIOLOGY:    Clean Catch Clean Catch (Midstream)  06-04-22   10,000 - 49,000 CFU/mL Pseudomonas aeruginosa  --  --    .Blood Blood-Peripheral  06-03-22   No growth to date.  --  --    .Blood Blood-Peripheral  06-03-22   No growth to date.  --  --    Rapid RVP Result: NotDetec (06-03 @ 14:14)    (otherwise reviewed)    RADIOLOGY:    6/4 US:    IMPRESSION:  No evidence of deep venous thrombosis in either lower extremity.  Subcutaneous edema is visualized in the popliteal regions and calves   bilaterally.

## 2022-06-06 NOTE — CONSULT NOTE ADULT - SUBJECTIVE AND OBJECTIVE BOX
HPI:  This is a 82 year old female with PMH CAD on aspirin, HTN, HLD, dermatitis who presented to the ED for b/l leg redness, swelling, pain that started all of a sudden around 3 days ago. Patient has also been experiencing chills, subjective fever, and worsening of her baseline dermatitis. She went to the dermatologist today and was sent to the ED. No CP, SOB, abd pain, N/V, dysuria.  In the ER, afebrile, HD stable. Given vanc, zosyn, 1L LR.    Dermatology consulted for a leg rash and a flaring body rash. __________________      PAST MEDICAL & SURGICAL HISTORY:  CAD (coronary artery disease)      Stented coronary artery      Arthritis      S/P coronary artery stent placement in 9/2011          Review of Systems:  REVIEW OF SYSTEMS      General: no fevers/chills, no lethary	    Skin/Breast: see HPI  	  Ophthalmologic: no eye pain or change in vision  	  ENMT: no dysphagia or change in hearing    Respiratory and Thorax: no SOB or cough  	  Cardiovascular: no palpitations or chest pain    Gastrointestinal: no abdomenal pain or blood in stool     Genitourinary: no dysuria or frequency    Musculoskeletal: no joint pains or weakness	    Neurological:no weakness, numbness , or tingling    MEDICATIONS  (STANDING):  AQUAPHOR (petrolatum Ointment) 1 Application(s) Topical three times a day  aspirin enteric coated 81 milliGRAM(s) Oral daily  atorvastatin 20 milliGRAM(s) Oral at bedtime  chlorhexidine 2% Cloths 1 Application(s) Topical daily  enoxaparin Injectable 40 milliGRAM(s) SubCutaneous every 24 hours  folic acid 1 milliGRAM(s) Oral daily  furosemide   Injectable 20 milliGRAM(s) IV Push once  hydrOXYzine hydrochloride 25 milliGRAM(s) Oral three times a day  levothyroxine 50 MICROGram(s) Oral daily  metoprolol tartrate 12.5 milliGRAM(s) Oral two times a day  urea Oral Powder 15 Gram(s) Oral daily    ALLERGIES: No Known Allergies        SOCIAL HISTORY:    Denies tobacco or alcohol use  FAMILY HISTORY:  No pertinent family history in first degree relatives          VITAL SIGNS LAST 24 HOURS:  T(F): 98.2 (06-06 @ 12:00), Max: 98.2 (06-06 @ 12:00)  HR: 70 (06-06 @ 12:00) (70 - 79)  BP: 115/58 (06-06 @ 12:00) (111/65 - 115/58)  RR: 18 (06-06 @ 12:00) (18 - 18)    PHYSICAL EXAM:     The patient was alert and oriented X 3, well nourished, and in no  apparent distress.  OP showed no ulcerations  There was no visible lymphadenopathy.  Conjunctiva were non injected  There was no clubbing or edema of extremities.  The scalp, hair, face, eyebrows, lips, OP, neck, chest, back,   extremities X 4, nails were examined.  There was no hyperhidrosis or bromhidrosis.    Of note on skin exam:     - thin eczematous ahd hyperpigmented plaques back of neck>upper arms> legs  - woody appearance to b/l lower legs        LABS:                        11.3   9.40  )-----------( 295      ( 06 Jun 2022 06:36 )             35.6              HPI:  This is a 82 year old female with PMH CAD on aspirin, HTN, HLD, dermatitis who presented to the ED for b/l leg redness, swelling, pain that started all of a sudden around 3 days ago. Patient has also been experiencing chills, subjective fever, and worsening of her baseline dermatitis. She went to the dermatologist today and was sent to the ED. No CP, SOB, abd pain, N/V, dysuria.  In the ER, afebrile, HD stable. Given vanc, zosyn, 1L LR.    Dermatology consulted for a leg rash and a flaring body rash. Patient has a history of atopic dermatitis. She has previously been on long term systemic steroids as per her son. Patient has had an on-and-off rash/swelling in her legs for years. She has seen multiple dermatologists in the past. Body rash has been going on "a long time" with an unknown duration. She has been offered dupxient in the past but has refused d/t potential SEs.       PAST MEDICAL & SURGICAL HISTORY:  CAD (coronary artery disease)      Stented coronary artery      Arthritis      S/P coronary artery stent placement in 9/2011          Review of Systems:  REVIEW OF SYSTEMS      General: no fevers/chills, no lethary	    Skin/Breast: see HPI  	  Ophthalmologic: no eye pain or change in vision  	  ENMT: no dysphagia or change in hearing    Respiratory and Thorax: no SOB or cough  	  Cardiovascular: no palpitations or chest pain    Gastrointestinal: no abdomenal pain or blood in stool     Genitourinary: no dysuria or frequency    Musculoskeletal: no joint pains or weakness	    Neurological:no weakness, numbness , or tingling    MEDICATIONS  (STANDING):  AQUAPHOR (petrolatum Ointment) 1 Application(s) Topical three times a day  aspirin enteric coated 81 milliGRAM(s) Oral daily  atorvastatin 20 milliGRAM(s) Oral at bedtime  chlorhexidine 2% Cloths 1 Application(s) Topical daily  enoxaparin Injectable 40 milliGRAM(s) SubCutaneous every 24 hours  folic acid 1 milliGRAM(s) Oral daily  furosemide   Injectable 20 milliGRAM(s) IV Push once  hydrOXYzine hydrochloride 25 milliGRAM(s) Oral three times a day  levothyroxine 50 MICROGram(s) Oral daily  metoprolol tartrate 12.5 milliGRAM(s) Oral two times a day  urea Oral Powder 15 Gram(s) Oral daily    ALLERGIES: No Known Allergies        SOCIAL HISTORY:    Denies tobacco or alcohol use  FAMILY HISTORY:  No pertinent family history in first degree relatives          VITAL SIGNS LAST 24 HOURS:  T(F): 98.2 (06-06 @ 12:00), Max: 98.2 (06-06 @ 12:00)  HR: 70 (06-06 @ 12:00) (70 - 79)  BP: 115/58 (06-06 @ 12:00) (111/65 - 115/58)  RR: 18 (06-06 @ 12:00) (18 - 18)    PHYSICAL EXAM:     The patient was alert and oriented X 3, well nourished, and in no  apparent distress.  OP showed no ulcerations  There was no visible lymphadenopathy.  Conjunctiva were non injected  There was no clubbing or edema of extremities.  The scalp, hair, face, eyebrows, lips, OP, neck, chest, back,   extremities X 4, nails were examined.  There was no hyperhidrosis or bromhidrosis.    Of note on skin exam:     - generalized erythematous plaques with scale on the trunk, extremities covering >90% BSA  - woody appearance to b/l lower legs        LABS:                        11.3   9.40  )-----------( 295      ( 06 Jun 2022 06:36 )             35.6

## 2022-06-06 NOTE — PROGRESS NOTE ADULT - SUBJECTIVE AND OBJECTIVE BOX
Cardiovascular Disease Progress Note    Overnight events: No acute events overnight.  Ms. Cruz denies chest pain or SOB.   Otherwise review of systems negative    Objective Findings:  T(C): 36.7 (06-06-22 @ 04:27), Max: 36.7 (06-06-22 @ 04:27)  HR: 79 (06-06-22 @ 04:27) (74 - 79)  BP: 111/65 (06-06-22 @ 04:27) (111/65 - 114/62)  RR: 18 (06-06-22 @ 04:27) (18 - 18)  SpO2: 97% (06-06-22 @ 04:27) (97% - 97%)  Wt(kg): --  Daily     Daily       Physical Exam:  Gen: NAD; Patient resting comfortably  HEENT: EOMI, Normocephalic/ atraumatic  CV: RRR, normal S1 + S2, no m/r/g  Lungs:  Normal respiratory effort; clear to auscultation bilaterally  Abd: soft, non-tender; bowel sounds present  Ext: No edema; warm and well perfused    Telemetry: N/a    Laboratory Data:                        11.3   9.40  )-----------( 295      ( 06 Jun 2022 06:36 )             35.6                     Inpatient Medications:  MEDICATIONS  (STANDING):  AQUAPHOR (petrolatum Ointment) 1 Application(s) Topical three times a day  aspirin enteric coated 81 milliGRAM(s) Oral daily  atorvastatin 20 milliGRAM(s) Oral at bedtime  chlorhexidine 2% Cloths 1 Application(s) Topical daily  enoxaparin Injectable 40 milliGRAM(s) SubCutaneous every 24 hours  folic acid 1 milliGRAM(s) Oral daily  hydrOXYzine hydrochloride 25 milliGRAM(s) Oral three times a day  levothyroxine 50 MICROGram(s) Oral daily  metoprolol tartrate 12.5 milliGRAM(s) Oral two times a day  urea Oral Powder 15 Gram(s) Oral daily      Assessment:  82 year old female with PMH CAD s/p CABG x3 in 2012 on aspirin, HTN, HLD, dermatitis who presented to the ED for b/l leg redness, swelling, pain that started all of a sudden around 3 days ago    Plan of Care:    #CAD  - s/p CABG x3  - EKG shows sinus rhythm, with no acute ischemic changes  - Pt states she saw her private cardiologist 2 days prior to admission and no changes were made to her medications.   - TTE from 2012 normal   - BNP elevated however pt does not appear fluid overloaded  - Will obtain TTE for further analysis  - Continue ASA, BB and statin     #HTN  - BP acceptable  - Continue BB    #HLD  - Statin as ordered    #Cellulitis  - Low suspicion  - Likely chronic venous stasis  - Compression stockings  - Management as per primary team  - US negative for DVT    #ACP (advance care planning)-  Advanced care planning was discussed with the patient.   Risks, benefits and alternatives of medical treatment and procedures were discussed in detail and all questions were answered. 30 minutes spent addressing advance care plans.          Over 25 minutes spent on total encounter; more than 50% of the visit was spent counseling and/or coordinating care by the attending physician.      Adonis Farley D.O.   Cardiovascular Disease  [phone] Cardiovascular Disease Progress Note    Overnight events: No acute events overnight.  Ms. Cruz denies chest pain or SOB.   Otherwise review of systems negative    Objective Findings:  T(C): 36.7 (06-06-22 @ 04:27), Max: 36.7 (06-06-22 @ 04:27)  HR: 79 (06-06-22 @ 04:27) (74 - 79)  BP: 111/65 (06-06-22 @ 04:27) (111/65 - 114/62)  RR: 18 (06-06-22 @ 04:27) (18 - 18)  SpO2: 97% (06-06-22 @ 04:27) (97% - 97%)  Wt(kg): --  Daily     Daily       Physical Exam:  Gen: NAD; Patient resting comfortably  HEENT: EOMI, Normocephalic/ atraumatic  CV: RRR, normal S1 + S2, no m/r/g  Lungs:  Normal respiratory effort; clear to auscultation bilaterally  Abd: soft, non-tender; bowel sounds present  Ext: 1+ edema in LE extremities; warm and well perfused    Telemetry: N/a    Laboratory Data:                        11.3   9.40  )-----------( 295      ( 06 Jun 2022 06:36 )             35.6                     Inpatient Medications:  MEDICATIONS  (STANDING):  AQUAPHOR (petrolatum Ointment) 1 Application(s) Topical three times a day  aspirin enteric coated 81 milliGRAM(s) Oral daily  atorvastatin 20 milliGRAM(s) Oral at bedtime  chlorhexidine 2% Cloths 1 Application(s) Topical daily  enoxaparin Injectable 40 milliGRAM(s) SubCutaneous every 24 hours  folic acid 1 milliGRAM(s) Oral daily  hydrOXYzine hydrochloride 25 milliGRAM(s) Oral three times a day  levothyroxine 50 MICROGram(s) Oral daily  metoprolol tartrate 12.5 milliGRAM(s) Oral two times a day  urea Oral Powder 15 Gram(s) Oral daily      Assessment:  82 year old female with PMH CAD s/p CABG x3 in 2012 on aspirin, HTN, HLD, dermatitis who presented to the ED for b/l leg redness, swelling, pain that started all of a sudden around 3 days ago    Plan of Care:    #CAD  - s/p CABG x3  - EKG shows sinus rhythm, with no acute ischemic changes  - Pt states she saw her private cardiologist 2 days prior to admission and no changes were made to her medications.   - TTE from 2012 normal   - BNP elevated however pt does not appear fluid overloaded  - Will obtain TTE for further analysis  - Continue ASA, BB and statin     #HTN  - BP acceptable  - Continue BB    #HLD  - Statin as ordered    #Cellulitis  - Low suspicion  - Likely chronic venous stasis  - Compression stockings  - Management as per primary team  - US negative for DVT  - Will administer 1 time dose of Lasix to see if there is improvement in edema.     #ACP (advance care planning)-  Advanced care planning was discussed with the patient.   Risks, benefits and alternatives of medical treatment and procedures were discussed in detail and all questions were answered. 30 minutes spent addressing advance care plans.          Over 25 minutes spent on total encounter; more than 50% of the visit was spent counseling and/or coordinating care by the attending physician.      Adonis Farley D.O.   Cardiovascular Disease  [phone]

## 2022-06-06 NOTE — CONSULT NOTE ADULT - ATTENDING COMMENTS
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 May use topical corticosteroids for now. Will plan to initiate dupilumab in the outpatient setting.    Portillo Gonzalez MD, PharmD, MPH  Co-Director, Inpatient Dermatology Consultation Service, [facility]

## 2022-06-06 NOTE — PROGRESS NOTE ADULT - ASSESSMENT
81 yo F CAD, HTN, HLD, LE redness bilaterally. Increasing edema LE. Longstanding dermatitis.  No fever, no leukocytosis, note eosinophilia  Known to have longstanding dermatitis--per derm currently in flare  LE edema and redness bilaterally with no purulence--lower suspicion for cellulitis, favor CVS  Otherwise only other symptoms is itching of skin in setting of dermatitis  Overall,  1) LE Redness/edema  - Suspect CVS, improved off abx--lower suspicion infection  - Monitor off abx  - Note dermatology input regarding symptomatic care  - Monitor site for signs developing cellulitis  2) Eosinophilia  - Trend to normal  - Related to underlying dermatitis? Allergic?  - Monitor eosinophils  3) CVS  - Compression stockings  - Evaluation for other organ system causes of LE edema per team    Signing off. Please call with further questions or change in status.    Venkat Perez MD  Contact on TEAMS messaging from 9am - 5pm  From 5pm-9am, and on weekends call [phone]

## 2022-06-07 ENCOUNTER — TRANSCRIPTION ENCOUNTER (OUTPATIENT)
Age: 82
End: 2022-06-07

## 2022-06-07 LAB
-  AMIKACIN: SIGNIFICANT CHANGE UP
-  AZTREONAM: SIGNIFICANT CHANGE UP
-  CEFEPIME: SIGNIFICANT CHANGE UP
-  CEFTAZIDIME: SIGNIFICANT CHANGE UP
-  CIPROFLOXACIN: SIGNIFICANT CHANGE UP
-  GENTAMICIN: SIGNIFICANT CHANGE UP
-  IMIPENEM: SIGNIFICANT CHANGE UP
-  LEVOFLOXACIN: SIGNIFICANT CHANGE UP
-  MEROPENEM: SIGNIFICANT CHANGE UP
-  PIPERACILLIN/TAZOBACTAM: SIGNIFICANT CHANGE UP
-  TOBRAMYCIN: SIGNIFICANT CHANGE UP
ANION GAP SERPL CALC-SCNC: 8 MMOL/L — SIGNIFICANT CHANGE UP (ref 5–17)
BASOPHILS # BLD AUTO: 0.06 K/UL — SIGNIFICANT CHANGE UP (ref 0–0.2)
BASOPHILS NFR BLD AUTO: 0.7 % — SIGNIFICANT CHANGE UP (ref 0–2)
BUN SERPL-MCNC: 23 MG/DL — SIGNIFICANT CHANGE UP (ref 7–23)
CALCIUM SERPL-MCNC: 9.5 MG/DL — SIGNIFICANT CHANGE UP (ref 8.4–10.5)
CHLORIDE SERPL-SCNC: 102 MMOL/L — SIGNIFICANT CHANGE UP (ref 96–108)
CO2 SERPL-SCNC: 26 MMOL/L — SIGNIFICANT CHANGE UP (ref 22–31)
CREAT SERPL-MCNC: 0.71 MG/DL — SIGNIFICANT CHANGE UP (ref 0.5–1.3)
CULTURE RESULTS: SIGNIFICANT CHANGE UP
EGFR: 84 ML/MIN/1.73M2 — SIGNIFICANT CHANGE UP
EOSINOPHIL # BLD AUTO: 2.81 K/UL — HIGH (ref 0–0.5)
EOSINOPHIL NFR BLD AUTO: 31.1 % — HIGH (ref 0–6)
GLUCOSE SERPL-MCNC: 104 MG/DL — HIGH (ref 70–99)
HCT VFR BLD CALC: 35.7 % — SIGNIFICANT CHANGE UP (ref 34.5–45)
HGB BLD-MCNC: 11.4 G/DL — LOW (ref 11.5–15.5)
IMM GRANULOCYTES NFR BLD AUTO: 1.1 % — SIGNIFICANT CHANGE UP (ref 0–1.5)
LYMPHOCYTES # BLD AUTO: 1.85 K/UL — SIGNIFICANT CHANGE UP (ref 1–3.3)
LYMPHOCYTES # BLD AUTO: 20.5 % — SIGNIFICANT CHANGE UP (ref 13–44)
MCHC RBC-ENTMCNC: 27.9 PG — SIGNIFICANT CHANGE UP (ref 27–34)
MCHC RBC-ENTMCNC: 31.9 GM/DL — LOW (ref 32–36)
MCV RBC AUTO: 87.3 FL — SIGNIFICANT CHANGE UP (ref 80–100)
METHOD TYPE: SIGNIFICANT CHANGE UP
MONOCYTES # BLD AUTO: 0.64 K/UL — SIGNIFICANT CHANGE UP (ref 0–0.9)
MONOCYTES NFR BLD AUTO: 7.1 % — SIGNIFICANT CHANGE UP (ref 2–14)
NEUTROPHILS # BLD AUTO: 3.57 K/UL — SIGNIFICANT CHANGE UP (ref 1.8–7.4)
NEUTROPHILS NFR BLD AUTO: 39.5 % — LOW (ref 43–77)
NRBC # BLD: 0 /100 WBCS — SIGNIFICANT CHANGE UP (ref 0–0)
ORGANISM # SPEC MICROSCOPIC CNT: SIGNIFICANT CHANGE UP
ORGANISM # SPEC MICROSCOPIC CNT: SIGNIFICANT CHANGE UP
PLATELET # BLD AUTO: 332 K/UL — SIGNIFICANT CHANGE UP (ref 150–400)
POTASSIUM SERPL-MCNC: 4 MMOL/L — SIGNIFICANT CHANGE UP (ref 3.5–5.3)
POTASSIUM SERPL-SCNC: 4 MMOL/L — SIGNIFICANT CHANGE UP (ref 3.5–5.3)
RBC # BLD: 4.09 M/UL — SIGNIFICANT CHANGE UP (ref 3.8–5.2)
RBC # FLD: 16.2 % — HIGH (ref 10.3–14.5)
SARS-COV-2 RNA SPEC QL NAA+PROBE: SIGNIFICANT CHANGE UP
SODIUM SERPL-SCNC: 136 MMOL/L — SIGNIFICANT CHANGE UP (ref 135–145)
SPECIMEN SOURCE: SIGNIFICANT CHANGE UP
WBC # BLD: 9.03 K/UL — SIGNIFICANT CHANGE UP (ref 3.8–10.5)
WBC # FLD AUTO: 9.03 K/UL — SIGNIFICANT CHANGE UP (ref 3.8–10.5)

## 2022-06-07 RX ORDER — LANOLIN ALCOHOL/MO/W.PET/CERES
1 CREAM (GRAM) TOPICAL
Qty: 0 | Refills: 0 | DISCHARGE
Start: 2022-06-07

## 2022-06-07 RX ORDER — FUROSEMIDE 40 MG
20 TABLET ORAL DAILY
Refills: 0 | Status: DISCONTINUED | OUTPATIENT
Start: 2022-06-07 | End: 2022-06-13

## 2022-06-07 RX ORDER — UREA 15 G
1 POWDER IN PACKET (EA) ORAL
Qty: 0 | Refills: 0 | DISCHARGE

## 2022-06-07 RX ORDER — HYDROXYZINE HCL 10 MG
1 TABLET ORAL
Qty: 0 | Refills: 0 | DISCHARGE

## 2022-06-07 RX ORDER — ACETAMINOPHEN 500 MG
2 TABLET ORAL
Qty: 0 | Refills: 0 | DISCHARGE
Start: 2022-06-07

## 2022-06-07 RX ORDER — IPRATROPIUM/ALBUTEROL SULFATE 18-103MCG
3 AEROSOL WITH ADAPTER (GRAM) INHALATION ONCE
Refills: 0 | Status: COMPLETED | OUTPATIENT
Start: 2022-06-07 | End: 2022-06-07

## 2022-06-07 RX ORDER — DIPHENHYDRAMINE HCL 50 MG
1 CAPSULE ORAL
Qty: 0 | Refills: 0 | DISCHARGE
Start: 2022-06-07

## 2022-06-07 RX ORDER — HYDROXYZINE HCL 10 MG
1 TABLET ORAL
Qty: 0 | Refills: 0 | DISCHARGE
Start: 2022-06-07

## 2022-06-07 RX ORDER — UREA 15 G
1 POWDER IN PACKET (EA) ORAL
Qty: 0 | Refills: 0 | DISCHARGE
Start: 2022-06-07

## 2022-06-07 RX ORDER — FUROSEMIDE 40 MG
1 TABLET ORAL
Qty: 0 | Refills: 0 | DISCHARGE
Start: 2022-06-07

## 2022-06-07 RX ORDER — PETROLATUM,WHITE
1 JELLY (GRAM) TOPICAL
Qty: 0 | Refills: 0 | DISCHARGE
Start: 2022-06-07

## 2022-06-07 RX ADMIN — Medication 12.5 MILLIGRAM(S): at 17:50

## 2022-06-07 RX ADMIN — ATORVASTATIN CALCIUM 20 MILLIGRAM(S): 80 TABLET, FILM COATED ORAL at 22:24

## 2022-06-07 RX ADMIN — Medication 12.5 MILLIGRAM(S): at 05:17

## 2022-06-07 RX ADMIN — Medication 25 MILLIGRAM(S): at 18:56

## 2022-06-07 RX ADMIN — Medication 25 MILLIGRAM(S): at 22:22

## 2022-06-07 RX ADMIN — Medication 1 APPLICATION(S): at 17:53

## 2022-06-07 RX ADMIN — Medication 1 APPLICATION(S): at 22:24

## 2022-06-07 RX ADMIN — Medication 1 APPLICATION(S): at 05:17

## 2022-06-07 RX ADMIN — ENOXAPARIN SODIUM 40 MILLIGRAM(S): 100 INJECTION SUBCUTANEOUS at 22:23

## 2022-06-07 RX ADMIN — Medication 15 GRAM(S): at 12:19

## 2022-06-07 RX ADMIN — Medication 81 MILLIGRAM(S): at 12:18

## 2022-06-07 RX ADMIN — Medication 1 MILLIGRAM(S): at 12:19

## 2022-06-07 RX ADMIN — Medication 25 MILLIGRAM(S): at 05:17

## 2022-06-07 RX ADMIN — Medication 50 MICROGRAM(S): at 05:17

## 2022-06-07 RX ADMIN — Medication 3 MILLILITER(S): at 10:10

## 2022-06-07 NOTE — GOALS OF CARE CONVERSATION - ADVANCED CARE PLANNING - CONVERSATION DETAILS
Introduced self and role of the PCE.  Patient was seen for goals of care conversation and son Rafat was present at bedside.  Son reports he has completed HCP forms previously and alternate is the sister Mavis.  No documents found on patient window search.  Pt would prefer to be home but is going to rehab first for a few weeks and then home to with family.  PT was living alone and independent prior.  Family is interested in HHA services and  provided.     CPR/ intubation procedures and possible complications explained.  Wants full code status and receive CPR intubation.      Pt instructed to provide a copy of HCP for medical records.  Contact information for further needs provided.  No [employer] exemptions noted. Code AOVF given.       Natalie Corey RN  Palliative Care Educator  [phone] cell

## 2022-06-07 NOTE — DISCHARGE NOTE PROVIDER - NSDCMRMEDTOKEN_GEN_ALL_CORE_FT
acetaminophen 325 mg oral tablet: 2 tab(s) orally every 6 hours, As needed, Temp greater or equal to 38C (100.4F), Mild Pain (1 - 3)  aspirin 81 mg oral delayed release tablet: 1 tab(s) orally once a day  diphenhydrAMINE 25 mg oral capsule: 1 cap(s) orally every 4 hours, As needed, Rash and/or Itching  folic acid 1 mg oral tablet: 1 tab(s) orally once a day  furosemide 20 mg oral tablet: 1 tab(s) orally once a day  hydrOXYzine hydrochloride 25 mg oral tablet: 1 tab(s) orally 3 times a day  levothyroxine 50 mcg (0.05 mg) oral tablet: 1 tab(s) orally once a day  melatonin 3 mg oral tablet: 1 tab(s) orally once a day (at bedtime), As needed, Insomnia  metoprolol tartrate 25 mg oral tablet: 0.5 tab(s) orally 2 times a day  petrolatum topical ointment: 1 application topically 3 times a day  rosuvastatin 5 mg oral tablet: 1 tab(s) orally once a day (at bedtime)  triamcinolone 0.1% topical ointment: 1 application topically 2 times a day, As needed, itching  urea 15 g oral powder for reconstitution: 1 packet(s) orally once a day   acetaminophen 325 mg oral tablet: 2 tab(s) orally every 6 hours, As needed, Temp greater or equal to 38C (100.4F), Mild Pain (1 - 3)  aspirin 81 mg oral delayed release tablet: 1 tab(s) orally once a day  diphenhydrAMINE 25 mg oral capsule: 1 cap(s) orally every 4 hours, As needed, Rash and/or Itching  folic acid 1 mg oral tablet: 1 tab(s) orally once a day  furosemide 20 mg oral tablet: 1 tab(s) orally once a day  hydrOXYzine hydrochloride 25 mg oral tablet: 1 tab(s) orally 3 times a day, As Needed -for mild pain - for itching   levothyroxine 50 mcg (0.05 mg) oral tablet: 1 tab(s) orally once a day  melatonin 3 mg oral tablet: 1 tab(s) orally once a day (at bedtime), As needed, Insomnia  metoprolol tartrate 25 mg oral tablet: 0.5 tab(s) orally 2 times a day  petrolatum topical ointment: 1 application topically 3 times a day  rosuvastatin 5 mg oral tablet: 1 tab(s) orally once a day (at bedtime)  triamcinolone 0.1% topical ointment: 1 application topically 2 times a day, As needed, itching    urea 15 g oral powder for reconstitution: 1 packet(s) orally once a day

## 2022-06-07 NOTE — PROGRESS NOTE ADULT - SUBJECTIVE AND OBJECTIVE BOX
Patient is a 82y old  Female who presents with a chief complaint of LE cellulitis (07 Jun 2022 12:36)      INTERVAL HPI/OVERNIGHT EVENTS:  T(C): 36.7 (06-07-22 @ 20:01), Max: 36.9 (06-07-22 @ 13:45)  HR: 65 (06-07-22 @ 20:01) (65 - 78)  BP: 111/52 (06-07-22 @ 20:01) (104/53 - 127/60)  RR: 18 (06-07-22 @ 20:01) (18 - 24)  SpO2: 98% (06-07-22 @ 20:01) (94% - 98%)  Wt(kg): --  I&O's Summary    06 Jun 2022 07:01  -  07 Jun 2022 07:00  --------------------------------------------------------  IN: 1440 mL / OUT: 0 mL / NET: 1440 mL    07 Jun 2022 07:01  -  07 Jun 2022 23:16  --------------------------------------------------------  IN: 600 mL / OUT: 0 mL / NET: 600 mL        PAST MEDICAL & SURGICAL HISTORY:  CAD (coronary artery disease)      Stented coronary artery      Arthritis      S/P coronary artery stent placement in 9/2011          SOCIAL HISTORY  Alcohol:  Tobacco:  Illicit substance use:    FAMILY HISTORY:    REVIEW OF SYSTEMS:  CONSTITUTIONAL: No fever, weight loss, or fatigue  EYES: No eye pain, visual disturbances, or discharge  ENMT:  No difficulty hearing, tinnitus, vertigo; No sinus or throat pain  NECK: No pain or stiffness  RESPIRATORY: No cough, wheezing, chills or hemoptysis; No shortness of breath  CARDIOVASCULAR: No chest pain, palpitations, dizziness, or leg swelling  GASTROINTESTINAL: No abdominal or epigastric pain. No nausea, vomiting, or hematemesis; No diarrhea or constipation. No melena or hematochezia.  GENITOURINARY: No dysuria, frequency, hematuria, or incontinence  NEUROLOGICAL: No headaches, memory loss, loss of strength, numbness, or tremors  SKIN: No itching, burning, rashes, or lesions   LYMPH NODES: No enlarged glands  ENDOCRINE: No heat or cold intolerance; No hair loss  MUSCULOSKELETAL: No joint pain or swelling; No muscle, back, or extremity pain  PSYCHIATRIC: No depression, anxiety, mood swings, or difficulty sleeping  HEME/LYMPH: No easy bruising, or bleeding gums  ALLERY AND IMMUNOLOGIC: No hives or eczema    RADIOLOGY & ADDITIONAL TESTS:    Imaging Personally Reviewed:  [ ] YES  [ ] NO    Consultant(s) Notes Reviewed:  [ ] YES  [ ] NO    PHYSICAL EXAM:  GENERAL: NAD, well-groomed, well-developed  HEAD:  Atraumatic, Normocephalic  EYES: EOMI, PERRLA, conjunctiva and sclera clear  ENMT: No tonsillar erythema, exudates, or enlargement; Moist mucous membranes, Good dentition, No lesions  NECK: Supple, No JVD, Normal thyroid  NERVOUS SYSTEM:  Alert & Oriented X3, Good concentration; Motor Strength 5/5 B/L upper and lower extremities; DTRs 2+ intact and symmetric  CHEST/LUNG: Clear to percussion bilaterally; No rales, rhonchi, wheezing, or rubs  HEART: Regular rate and rhythm; No murmurs, rubs, or gallops  ABDOMEN: Soft, Nontender, Nondistended; Bowel sounds present  EXTREMITIES:  2+ Peripheral Pulses, No clubbing, cyanosis, or edema  LYMPH: No lymphadenopathy noted  SKIN: No rashes or lesions    LABS:                        11.4   9.03  )-----------( 332      ( 07 Jun 2022 07:02 )             35.7     06-07    136  |  102  |  23  ----------------------------<  104<H>  4.0   |  26  |  0.71    Ca    9.5      07 Jun 2022 06:52          CAPILLARY BLOOD GLUCOSE                MEDICATIONS  (STANDING):  AQUAPHOR (petrolatum Ointment) 1 Application(s) Topical three times a day  aspirin enteric coated 81 milliGRAM(s) Oral daily  atorvastatin 20 milliGRAM(s) Oral at bedtime  chlorhexidine 2% Cloths 1 Application(s) Topical daily  enoxaparin Injectable 40 milliGRAM(s) SubCutaneous every 24 hours  folic acid 1 milliGRAM(s) Oral daily  furosemide    Tablet 20 milliGRAM(s) Oral daily  furosemide   Injectable 20 milliGRAM(s) IV Push once  hydrOXYzine hydrochloride 25 milliGRAM(s) Oral three times a day  levothyroxine 50 MICROGram(s) Oral daily  metoprolol tartrate 12.5 milliGRAM(s) Oral two times a day  urea Oral Powder 15 Gram(s) Oral daily    MEDICATIONS  (PRN):  acetaminophen     Tablet .. 650 milliGRAM(s) Oral every 6 hours PRN Temp greater or equal to 38C (100.4F), Mild Pain (1 - 3)  aluminum hydroxide/magnesium hydroxide/simethicone Suspension 30 milliLiter(s) Oral every 4 hours PRN Dyspepsia  benzocaine 15 mG/menthol 3.6 mG Lozenge 1 Lozenge Oral two times a day PRN Sore Throat  diphenhydrAMINE 25 milliGRAM(s) Oral every 4 hours PRN Rash and/or Itching  melatonin 3 milliGRAM(s) Oral at bedtime PRN Insomnia  ondansetron Injectable 4 milliGRAM(s) IV Push every 8 hours PRN Nausea and/or Vomiting  triamcinolone 0.1% Ointment 1 Application(s) Topical two times a day PRN itching      Care Discussed with Consultants/Other Providers [ ] YES  [ ] NO Patient is a 82y old  Female who presents with a chief complaint of LE cellulitis (07 Jun 2022 12:36)      INTERVAL HPI/OVERNIGHT EVENTS: seen and examined   T(C): 36.7 (06-07-22 @ 20:01), Max: 36.9 (06-07-22 @ 13:45)  HR: 65 (06-07-22 @ 20:01) (65 - 78)  BP: 111/52 (06-07-22 @ 20:01) (104/53 - 127/60)  RR: 18 (06-07-22 @ 20:01) (18 - 24)  SpO2: 98% (06-07-22 @ 20:01) (94% - 98%)  Wt(kg): --  I&O's Summary    06 Jun 2022 07:01  -  07 Jun 2022 07:00  --------------------------------------------------------  IN: 1440 mL / OUT: 0 mL / NET: 1440 mL    07 Jun 2022 07:01  -  07 Jun 2022 23:16  --------------------------------------------------------  IN: 600 mL / OUT: 0 mL / NET: 600 mL        PAST MEDICAL & SURGICAL HISTORY:  CAD (coronary artery disease)      Stented coronary artery      Arthritis      S/P coronary artery stent placement in 9/2011          SOCIAL HISTORY  Alcohol:  Tobacco:  Illicit substance use:    FAMILY HISTORY:    REVIEW OF SYSTEMS:  CONSTITUTIONAL: No fever, weight loss, or fatigue  EYES: No eye pain, visual disturbances, or discharge  ENMT:  No difficulty hearing, tinnitus, vertigo; No sinus or throat pain  NECK: No pain or stiffness  RESPIRATORY: No cough, wheezing, chills or hemoptysis; No shortness of breath  CARDIOVASCULAR: No chest pain, palpitations, dizziness, or leg swelling  GASTROINTESTINAL: No abdominal or epigastric pain. No nausea, vomiting, or hematemesis; No diarrhea or constipation. No melena or hematochezia.  GENITOURINARY: No dysuria, frequency, hematuria, or incontinence  NEUROLOGICAL: No headaches, memory loss, loss of strength, numbness, or tremors  SKIN: No itching, burning, rashes, or lesions   LYMPH NODES: No enlarged glands  ENDOCRINE: No heat or cold intolerance; No hair loss  MUSCULOSKELETAL: No joint pain or swelling; No muscle, back, or extremity pain  PSYCHIATRIC: No depression, anxiety, mood swings, or difficulty sleeping  HEME/LYMPH: No easy bruising, or bleeding gums  ALLERY AND IMMUNOLOGIC: No hives or eczema    RADIOLOGY & ADDITIONAL TESTS:    Imaging Personally Reviewed:  [ ] YES  [ ] NO    Consultant(s) Notes Reviewed:  [ ] YES  [ ] NO    PHYSICAL EXAM:  GENERAL: NAD, well-groomed, well-developed  HEAD:  Atraumatic, Normocephalic  EYES: EOMI, PERRLA, conjunctiva and sclera clear  ENMT: No tonsillar erythema, exudates, or enlargement; Moist mucous membranes, Good dentition, No lesions  NECK: Supple, No JVD, Normal thyroid  NERVOUS SYSTEM:  Alert & Oriented X3, Good concentration; Motor Strength 5/5 B/L upper and lower extremities; DTRs 2+ intact and symmetric  CHEST/LUNG: Clear to percussion bilaterally; No rales, rhonchi, wheezing, or rubs  HEART: Regular rate and rhythm; No murmurs, rubs, or gallops  ABDOMEN: Soft, Nontender, Nondistended; Bowel sounds present  EXTREMITIES:  2+ Peripheral Pulses, No clubbing, cyanosis, or edema  LYMPH: No lymphadenopathy noted  SKIN: No rashes or lesions    LABS:                        11.4   9.03  )-----------( 332      ( 07 Jun 2022 07:02 )             35.7     06-07    136  |  102  |  23  ----------------------------<  104<H>  4.0   |  26  |  0.71    Ca    9.5      07 Jun 2022 06:52          CAPILLARY BLOOD GLUCOSE                MEDICATIONS  (STANDING):  AQUAPHOR (petrolatum Ointment) 1 Application(s) Topical three times a day  aspirin enteric coated 81 milliGRAM(s) Oral daily  atorvastatin 20 milliGRAM(s) Oral at bedtime  chlorhexidine 2% Cloths 1 Application(s) Topical daily  enoxaparin Injectable 40 milliGRAM(s) SubCutaneous every 24 hours  folic acid 1 milliGRAM(s) Oral daily  furosemide    Tablet 20 milliGRAM(s) Oral daily  furosemide   Injectable 20 milliGRAM(s) IV Push once  hydrOXYzine hydrochloride 25 milliGRAM(s) Oral three times a day  levothyroxine 50 MICROGram(s) Oral daily  metoprolol tartrate 12.5 milliGRAM(s) Oral two times a day  urea Oral Powder 15 Gram(s) Oral daily    MEDICATIONS  (PRN):  acetaminophen     Tablet .. 650 milliGRAM(s) Oral every 6 hours PRN Temp greater or equal to 38C (100.4F), Mild Pain (1 - 3)  aluminum hydroxide/magnesium hydroxide/simethicone Suspension 30 milliLiter(s) Oral every 4 hours PRN Dyspepsia  benzocaine 15 mG/menthol 3.6 mG Lozenge 1 Lozenge Oral two times a day PRN Sore Throat  diphenhydrAMINE 25 milliGRAM(s) Oral every 4 hours PRN Rash and/or Itching  melatonin 3 milliGRAM(s) Oral at bedtime PRN Insomnia  ondansetron Injectable 4 milliGRAM(s) IV Push every 8 hours PRN Nausea and/or Vomiting  triamcinolone 0.1% Ointment 1 Application(s) Topical two times a day PRN itching      Care Discussed with Consultants/Other Providers [ ] YES  [ ] NO

## 2022-06-07 NOTE — GOALS OF CARE CONVERSATION - ADVANCED CARE PLANNING - SURROGATE NAME
From: James Shields  To: Home Lizarraga MD  Sent: 5/9/2019 10:22 AM CDT  Subject: Other    I sent you a request for my prescription refill. I have an appointment with you on monday but I will run out of pills Saturday.    Thanks Will brenden   Rafat ernandez [facility] sister

## 2022-06-07 NOTE — DISCHARGE NOTE PROVIDER - CARE PROVIDER_API CALL
BILL COOK J  Cardiovascular Diseases  [address] [place] [address]  Phone: [phone]  Fax: [phone]  Follow Up Time:    BILL COOK J  Cardiovascular Diseases  [address] [place] [address]  Phone: [phone]  Fax: [phone]  Follow Up Time:     Portillo Gonzalez)  Medicine  Dermatology  [address]  [place] [address]  Phone: [phone]  Fax: [phone]  Follow Up Time:

## 2022-06-07 NOTE — DISCHARGE NOTE PROVIDER - NSDCCPCAREPLAN_GEN_ALL_CORE_FT
PRINCIPAL DISCHARGE DIAGNOSIS  Diagnosis: Cellulitis  Assessment and Plan of Treatment: ruled out  no need for antibiotics      SECONDARY DISCHARGE DIAGNOSES  Diagnosis: Stasis dermatitis  Assessment and Plan of Treatment: - If needed for itch: triamcinolone 0.1% ointment BID to affected areas for up to 2 weeks on, then 1 week off. SED  - Elevate legs  - compression stockings to b/l lower legs    Diagnosis: Leg edema  Assessment and Plan of Treatment: Continue lasix as prescribed    Diagnosis: Erythroderma  Assessment and Plan of Treatment: - Continue triamcinolone 0.1% ointment BID to affected areas on face for up to 2 weeks. Avoid use around eyes and mouth.  - Moisturize face and body w/ plain Vaseline twice daily after applying topical steroids.  - Will consider outpatient initiation of dupxient  - follow up at our outpatient Dermatology office within 2 week(s)  [address], [address], [facility]  [phone]

## 2022-06-07 NOTE — DISCHARGE NOTE PROVIDER - PROVIDER TOKENS
PROVIDER:[TOKEN:[[id number]]] PROVIDER:[TOKEN:[[id number]]],PROVIDER:[TOKEN:[[id number]]]

## 2022-06-07 NOTE — PROGRESS NOTE ADULT - SUBJECTIVE AND OBJECTIVE BOX
Cardiovascular Disease Progress Note    Overnight events: No acute events overnight.  Ms. Cruz denies chest pain or SOB. Complains of diffuse pruritis.   Otherwise review of systems negative    Objective Findings:  T(C): 36.7 (06-07-22 @ 10:35), Max: 36.8 (06-06-22 @ 12:00)  HR: 69 (06-07-22 @ 10:35) (64 - 77)  BP: 104/53 (06-07-22 @ 10:35) (104/53 - 116/55)  RR: 24 (06-07-22 @ 10:35) (18 - 24)  SpO2: 98% (06-07-22 @ 10:35) (94% - 98%)  Wt(kg): --  Daily     Daily       Physical Exam:  Gen: NAD; Patient resting comfortably  HEENT: EOMI, Normocephalic/ atraumatic  CV: RRR, normal S1 + S2, no m/r/g  Lungs:  Normal respiratory effort; clear to auscultation bilaterally  Abd: soft, non-tender; bowel sounds present  Ext: No edema; warm and well perfused    Telemetry: N/a    Laboratory Data:                        11.4   9.03  )-----------( 332      ( 07 Jun 2022 07:02 )             35.7     06-07    136  |  102  |  23  ----------------------------<  104<H>  4.0   |  26  |  0.71    Ca    9.5      07 Jun 2022 06:52                Inpatient Medications:  MEDICATIONS  (STANDING):  AQUAPHOR (petrolatum Ointment) 1 Application(s) Topical three times a day  aspirin enteric coated 81 milliGRAM(s) Oral daily  atorvastatin 20 milliGRAM(s) Oral at bedtime  chlorhexidine 2% Cloths 1 Application(s) Topical daily  enoxaparin Injectable 40 milliGRAM(s) SubCutaneous every 24 hours  folic acid 1 milliGRAM(s) Oral daily  furosemide   Injectable 20 milliGRAM(s) IV Push once  hydrOXYzine hydrochloride 25 milliGRAM(s) Oral three times a day  levothyroxine 50 MICROGram(s) Oral daily  metoprolol tartrate 12.5 milliGRAM(s) Oral two times a day  urea Oral Powder 15 Gram(s) Oral daily      Assessment:  82 year old female with PMH CAD s/p CABG x3 in 2012 on aspirin, HTN, HLD, dermatitis who presented to the ED for b/l leg redness, swelling, pain that started all of a sudden around 3 days ago    Plan of Care:    #CAD  - s/p CABG x3  - EKG shows sinus rhythm, with no acute ischemic changes  - Pt states she saw her private cardiologist 2 days prior to admission and no changes were made to her medications.   - TTE from 2012 normal   - BNP elevated however pt does not appear fluid overloaded  - TTE 6/6 shows normal LV systolic function, with mild posterior MVP and moderate MR, mild to moderate TR and moderate pulmonary htn.   - No acute intervention warranted at this time.   - Continue ASA, BB and statin     #HTN  - BP acceptable  - Continue BB    #HLD  - Statin as ordered    #Cellulitis  - Likely chronic venous stasis  - Compression stockings  - Management as per primary team  - US negative for DVT  - Continue IV diuresis. Will transition to PO regimen tomorrow.         Over 25 minutes spent on total encounter; more than 50% of the visit was spent counseling and/or coordinating care by the attending physician.      Adonis Farley D.O.   Cardiovascular Disease  [phone] Cardiovascular Disease Progress Note    Overnight events: No acute events overnight.  Ms. Cruz denies chest pain or SOB. Complains of diffuse pruritis.   Otherwise review of systems negative    Objective Findings:  T(C): 36.7 (06-07-22 @ 10:35), Max: 36.8 (06-06-22 @ 12:00)  HR: 69 (06-07-22 @ 10:35) (64 - 77)  BP: 104/53 (06-07-22 @ 10:35) (104/53 - 116/55)  RR: 24 (06-07-22 @ 10:35) (18 - 24)  SpO2: 98% (06-07-22 @ 10:35) (94% - 98%)  Wt(kg): --  Daily     Daily       Physical Exam:  Gen: NAD; Patient resting comfortably  HEENT: EOMI, Normocephalic/ atraumatic  CV: RRR, normal S1 + S2, no m/r/g  Lungs:  Normal respiratory effort; clear to auscultation bilaterally  Abd: soft, non-tender; bowel sounds present  Ext: No edema; warm and well perfused    Telemetry: N/a    Laboratory Data:                        11.4   9.03  )-----------( 332      ( 07 Jun 2022 07:02 )             35.7     06-07    136  |  102  |  23  ----------------------------<  104<H>  4.0   |  26  |  0.71    Ca    9.5      07 Jun 2022 06:52                Inpatient Medications:  MEDICATIONS  (STANDING):  AQUAPHOR (petrolatum Ointment) 1 Application(s) Topical three times a day  aspirin enteric coated 81 milliGRAM(s) Oral daily  atorvastatin 20 milliGRAM(s) Oral at bedtime  chlorhexidine 2% Cloths 1 Application(s) Topical daily  enoxaparin Injectable 40 milliGRAM(s) SubCutaneous every 24 hours  folic acid 1 milliGRAM(s) Oral daily  furosemide   Injectable 20 milliGRAM(s) IV Push once  hydrOXYzine hydrochloride 25 milliGRAM(s) Oral three times a day  levothyroxine 50 MICROGram(s) Oral daily  metoprolol tartrate 12.5 milliGRAM(s) Oral two times a day  urea Oral Powder 15 Gram(s) Oral daily      Assessment:  82 year old female with PMH CAD s/p CABG x3 in 2012 on aspirin, HTN, HLD, dermatitis who presented to the ED for b/l leg redness, swelling, pain that started all of a sudden around 3 days ago    Plan of Care:    #CAD  - s/p CABG x3  - EKG shows sinus rhythm, with no acute ischemic changes  - Pt states she saw her private cardiologist 2 days prior to admission and no changes were made to her medications.   - TTE from 2012 normal   - BNP elevated however pt does not appear fluid overloaded  - TTE 6/6 shows normal LV systolic function, with mild posterior MVP and moderate MR, mild to moderate TR and moderate pulmonary htn.   - No acute intervention warranted at this time.   - Continue ASA, BB and statin     #HTN  - BP acceptable  - Continue BB    #HLD  - Statin as ordered    #Cellulitis  - Likely chronic venous stasis  - Compression stockings  - Management as per primary team  - US negative for DVT  - Pt responded well to IV diuretics  - Will start on Lasix 20mg daily maintenance dose.         Over 25 minutes spent on total encounter; more than 50% of the visit was spent counseling and/or coordinating care by the attending physician.      Adonis Farley D.O.   Cardiovascular Disease  [phone]

## 2022-06-07 NOTE — DISCHARGE NOTE PROVIDER - HOSPITAL COURSE
82 year old female with PMH CAD on aspirin, HTN, HLD, dermatitis who presented to the ED for b/l leg redness, swelling, pain that started all of a sudden around 3 days ago.     Pedal edema:  IV Lasix 20 mg IV x 1 dose, changed to po   ECHO normal LV function  CXR trace R effusion  Cardio f/up noted    Cellulitis.   ·  Plan: Ruled out  Likely stasis dermatitis  ID eval appreciated  -Monitor off abx.    CAD (coronary artery disease).   ·  Plan: - c/w home asa and BB.    HLD (hyperlipidemia).   ·  Plan: - cw home statin.        82 year old female with PMH CAD on aspirin, HTN, HLD, dermatitis who presented to the ED for b/l leg redness, swelling, pain that started all of a sudden around 3 days ago.     Pedal edema:  IV Lasix 20 mg IV x 1 dose, changed to po   ECHO normal LV function  CXR trace R effusion  Cardio f/up noted    Cellulitis.   ·  Plan: Ruled out  Likely stasis dermatitis  ID eval appreciated  -Monitor off abx.    CAD (coronary artery disease).   ·  Plan: - c/w home asa and BB.    HLD (hyperlipidemia).   ·  Plan: - cw home statin.  D/C to [facility]

## 2022-06-07 NOTE — PROGRESS NOTE ADULT - ASSESSMENT
This is a 82 year old female with PMH CAD on aspirin, HTN, HLD, dermatitis who presented to the ED for b/l leg redness, swelling, pain that started all of a sudden around 3 days ago.     Pedal edema:  IV Lasix 20 mg IV x 1 dose  ECHO  Cardio f/up noted This is a 82 year old female with PMH CAD on aspirin, HTN, HLD, dermatitis who presented to the ED for b/l leg redness, swelling, pain that started all of a sudden around 3 days ago.

## 2022-06-08 LAB
CULTURE RESULTS: SIGNIFICANT CHANGE UP
CULTURE RESULTS: SIGNIFICANT CHANGE UP
SPECIMEN SOURCE: SIGNIFICANT CHANGE UP
SPECIMEN SOURCE: SIGNIFICANT CHANGE UP

## 2022-06-08 RX ADMIN — Medication 20 MILLIGRAM(S): at 06:37

## 2022-06-08 RX ADMIN — Medication 81 MILLIGRAM(S): at 11:45

## 2022-06-08 RX ADMIN — Medication 50 MICROGRAM(S): at 06:38

## 2022-06-08 RX ADMIN — CHLORHEXIDINE GLUCONATE 1 APPLICATION(S): 213 SOLUTION TOPICAL at 11:47

## 2022-06-08 RX ADMIN — Medication 1 APPLICATION(S): at 17:42

## 2022-06-08 RX ADMIN — Medication 1 MILLIGRAM(S): at 11:45

## 2022-06-08 RX ADMIN — ATORVASTATIN CALCIUM 20 MILLIGRAM(S): 80 TABLET, FILM COATED ORAL at 21:29

## 2022-06-08 RX ADMIN — Medication 25 MILLIGRAM(S): at 06:37

## 2022-06-08 RX ADMIN — Medication 25 MILLIGRAM(S): at 21:29

## 2022-06-08 RX ADMIN — Medication 12.5 MILLIGRAM(S): at 17:41

## 2022-06-08 RX ADMIN — Medication 25 MILLIGRAM(S): at 13:36

## 2022-06-08 RX ADMIN — Medication 1 APPLICATION(S): at 06:38

## 2022-06-08 RX ADMIN — Medication 12.5 MILLIGRAM(S): at 06:37

## 2022-06-08 RX ADMIN — Medication 1 APPLICATION(S): at 13:36

## 2022-06-08 RX ADMIN — Medication 15 GRAM(S): at 11:45

## 2022-06-08 RX ADMIN — ENOXAPARIN SODIUM 40 MILLIGRAM(S): 100 INJECTION SUBCUTANEOUS at 19:48

## 2022-06-08 RX ADMIN — Medication 1 APPLICATION(S): at 21:30

## 2022-06-08 NOTE — PROGRESS NOTE ADULT - SUBJECTIVE AND OBJECTIVE BOX
Cardiovascular Disease Progress Note    Overnight events: No acute events overnight.  Pt seen this morning. Ms. Cruz is in no distress.   Otherwise review of systems negative    Objective Findings:  T(C): 36.2 (06-08-22 @ 04:56), Max: 36.9 (06-07-22 @ 13:45)  HR: 74 (06-08-22 @ 04:56) (65 - 78)  BP: 143/64 (06-08-22 @ 04:56) (104/53 - 143/64)  RR: 18 (06-08-22 @ 04:56) (18 - 24)  SpO2: 99% (06-08-22 @ 04:56) (96% - 99%)  Wt(kg): --  Daily     Daily       Physical Exam:  Gen: NAD; Patient resting comfortably  HEENT: EOMI, Normocephalic/ atraumatic  CV: RRR, normal S1 + S2, no m/r/g  Lungs:  Normal respiratory effort; clear to auscultation bilaterally  Abd: soft, non-tender; bowel sounds present  Ext: No edema; warm and well perfused    Telemetry: N/a     Laboratory Data:                        11.4   9.03  )-----------( 332      ( 07 Jun 2022 07:02 )             35.7     06-07    136  |  102  |  23  ----------------------------<  104<H>  4.0   |  26  |  0.71    Ca    9.5      07 Jun 2022 06:52                Inpatient Medications:  MEDICATIONS  (STANDING):  AQUAPHOR (petrolatum Ointment) 1 Application(s) Topical three times a day  aspirin enteric coated 81 milliGRAM(s) Oral daily  atorvastatin 20 milliGRAM(s) Oral at bedtime  chlorhexidine 2% Cloths 1 Application(s) Topical daily  enoxaparin Injectable 40 milliGRAM(s) SubCutaneous every 24 hours  folic acid 1 milliGRAM(s) Oral daily  furosemide    Tablet 20 milliGRAM(s) Oral daily  furosemide   Injectable 20 milliGRAM(s) IV Push once  hydrOXYzine hydrochloride 25 milliGRAM(s) Oral three times a day  levothyroxine 50 MICROGram(s) Oral daily  metoprolol tartrate 12.5 milliGRAM(s) Oral two times a day  urea Oral Powder 15 Gram(s) Oral daily      Assessment:  82 year old female with PMH CAD s/p CABG x3 in 2012 on aspirin, HTN, HLD, dermatitis who presented to the ED for b/l leg redness, swelling, pain that started all of a sudden around 3 days ago    Plan of Care:    #CAD  - s/p CABG x3  - EKG shows sinus rhythm, with no acute ischemic changes  - Pt states she saw her private cardiologist 2 days prior to admission and no changes were made to her medications.   - TTE from 2012 normal   - BNP elevated however pt does not appear fluid overloaded  - Will obtain TTE for further analysis  - Continue ASA, BB and statin   - Outpatient follow with primary cardiologist upon discharge.    #HTN  - BP acceptable  - Continue BB    #HLD  - Statin as ordered    #Cellulitis  - Low suspicion  - Likely chronic venous stasis  - Compression stockings  - Management as per primary team  - US negative for DVT  - Continue PO diuretic                Over 25 minutes spent on total encounter; more than 50% of the visit was spent counseling and/or coordinating care by the attending physician.      Adonis Farley D.O.   Cardiovascular Disease  [phone]

## 2022-06-08 NOTE — PROGRESS NOTE ADULT - SUBJECTIVE AND OBJECTIVE BOX
Patient is a 82y old  Female who presents with a chief complaint of LE cellulitis (07 Jun 2022 12:36)      INTERVAL HPI/OVERNIGHT EVENTS: seen and examined   T(C): 36.7 (06-07-22 @ 20:01), Max: 36.9 (06-07-22 @ 13:45)  HR: 65 (06-07-22 @ 20:01) (65 - 78)  BP: 111/52 (06-07-22 @ 20:01) (104/53 - 127/60)  RR: 18 (06-07-22 @ 20:01) (18 - 24)  SpO2: 98% (06-07-22 @ 20:01) (94% - 98%)  Wt(kg): --  I&O's Summary    06 Jun 2022 07:01  -  07 Jun 2022 07:00  --------------------------------------------------------  IN: 1440 mL / OUT: 0 mL / NET: 1440 mL    07 Jun 2022 07:01  -  07 Jun 2022 23:16  --------------------------------------------------------  IN: 600 mL / OUT: 0 mL / NET: 600 mL        PAST MEDICAL & SURGICAL HISTORY:  CAD (coronary artery disease)      Stented coronary artery      Arthritis      S/P coronary artery stent placement in 9/2011          SOCIAL HISTORY  Alcohol:  Tobacco:  Illicit substance use:    FAMILY HISTORY:    REVIEW OF SYSTEMS:  CONSTITUTIONAL: No fever, weight loss, or fatigue  EYES: No eye pain, visual disturbances, or discharge  ENMT:  No difficulty hearing, tinnitus, vertigo; No sinus or throat pain  NECK: No pain or stiffness  RESPIRATORY: No cough, wheezing, chills or hemoptysis; No shortness of breath  CARDIOVASCULAR: No chest pain, palpitations, dizziness, or leg swelling  GASTROINTESTINAL: No abdominal or epigastric pain. No nausea, vomiting, or hematemesis; No diarrhea or constipation. No melena or hematochezia.  GENITOURINARY: No dysuria, frequency, hematuria, or incontinence  NEUROLOGICAL: No headaches, memory loss, loss of strength, numbness, or tremors  SKIN: No itching, burning, rashes, or lesions   LYMPH NODES: No enlarged glands  ENDOCRINE: No heat or cold intolerance; No hair loss  MUSCULOSKELETAL: No joint pain or swelling; No muscle, back, or extremity pain  PSYCHIATRIC: No depression, anxiety, mood swings, or difficulty sleeping  HEME/LYMPH: No easy bruising, or bleeding gums  ALLERY AND IMMUNOLOGIC: No hives or eczema    RADIOLOGY & ADDITIONAL TESTS:    Imaging Personally Reviewed:  [ ] YES  [ ] NO    Consultant(s) Notes Reviewed:  [ ] YES  [ ] NO    PHYSICAL EXAM:  GENERAL: NAD, well-groomed, well-developed  HEAD:  Atraumatic, Normocephalic  EYES: EOMI, PERRLA, conjunctiva and sclera clear  ENMT: No tonsillar erythema, exudates, or enlargement; Moist mucous membranes, Good dentition, No lesions  NECK: Supple, No JVD, Normal thyroid  NERVOUS SYSTEM:  Alert & Oriented X3, Good concentration; Motor Strength 5/5 B/L upper and lower extremities; DTRs 2+ intact and symmetric  CHEST/LUNG: Clear to percussion bilaterally; No rales, rhonchi, wheezing, or rubs  HEART: Regular rate and rhythm; No murmurs, rubs, or gallops  ABDOMEN: Soft, Nontender, Nondistended; Bowel sounds present  EXTREMITIES:  2+ Peripheral Pulses, No clubbing, cyanosis, or edema  LYMPH: No lymphadenopathy noted  SKIN: No rashes or lesions    LABS:                        11.4   9.03  )-----------( 332      ( 07 Jun 2022 07:02 )             35.7     06-07    136  |  102  |  23  ----------------------------<  104<H>  4.0   |  26  |  0.71    Ca    9.5      07 Jun 2022 06:52          CAPILLARY BLOOD GLUCOSE                MEDICATIONS  (STANDING):  AQUAPHOR (petrolatum Ointment) 1 Application(s) Topical three times a day  aspirin enteric coated 81 milliGRAM(s) Oral daily  atorvastatin 20 milliGRAM(s) Oral at bedtime  chlorhexidine 2% Cloths 1 Application(s) Topical daily  enoxaparin Injectable 40 milliGRAM(s) SubCutaneous every 24 hours  folic acid 1 milliGRAM(s) Oral daily  furosemide    Tablet 20 milliGRAM(s) Oral daily  furosemide   Injectable 20 milliGRAM(s) IV Push once  hydrOXYzine hydrochloride 25 milliGRAM(s) Oral three times a day  levothyroxine 50 MICROGram(s) Oral daily  metoprolol tartrate 12.5 milliGRAM(s) Oral two times a day  urea Oral Powder 15 Gram(s) Oral daily    MEDICATIONS  (PRN):  acetaminophen     Tablet .. 650 milliGRAM(s) Oral every 6 hours PRN Temp greater or equal to 38C (100.4F), Mild Pain (1 - 3)  aluminum hydroxide/magnesium hydroxide/simethicone Suspension 30 milliLiter(s) Oral every 4 hours PRN Dyspepsia  benzocaine 15 mG/menthol 3.6 mG Lozenge 1 Lozenge Oral two times a day PRN Sore Throat  diphenhydrAMINE 25 milliGRAM(s) Oral every 4 hours PRN Rash and/or Itching  melatonin 3 milliGRAM(s) Oral at bedtime PRN Insomnia  ondansetron Injectable 4 milliGRAM(s) IV Push every 8 hours PRN Nausea and/or Vomiting  triamcinolone 0.1% Ointment 1 Application(s) Topical two times a day PRN itching      Care Discussed with Consultants/Other Providers [ ] YES  [ ] NO

## 2022-06-09 RX ADMIN — Medication 650 MILLIGRAM(S): at 08:43

## 2022-06-09 RX ADMIN — Medication 25 MILLIGRAM(S): at 21:51

## 2022-06-09 RX ADMIN — Medication 15 GRAM(S): at 12:34

## 2022-06-09 RX ADMIN — Medication 1 APPLICATION(S): at 21:51

## 2022-06-09 RX ADMIN — Medication 12.5 MILLIGRAM(S): at 05:17

## 2022-06-09 RX ADMIN — Medication 20 MILLIGRAM(S): at 05:17

## 2022-06-09 RX ADMIN — Medication 12.5 MILLIGRAM(S): at 18:45

## 2022-06-09 RX ADMIN — Medication 1 APPLICATION(S): at 05:18

## 2022-06-09 RX ADMIN — Medication 25 MILLIGRAM(S): at 15:38

## 2022-06-09 RX ADMIN — ATORVASTATIN CALCIUM 20 MILLIGRAM(S): 80 TABLET, FILM COATED ORAL at 21:51

## 2022-06-09 RX ADMIN — Medication 81 MILLIGRAM(S): at 12:35

## 2022-06-09 RX ADMIN — Medication 1 APPLICATION(S): at 15:39

## 2022-06-09 RX ADMIN — Medication 25 MILLIGRAM(S): at 05:17

## 2022-06-09 RX ADMIN — Medication 650 MILLIGRAM(S): at 07:43

## 2022-06-09 RX ADMIN — CHLORHEXIDINE GLUCONATE 1 APPLICATION(S): 213 SOLUTION TOPICAL at 12:34

## 2022-06-09 RX ADMIN — Medication 1 MILLIGRAM(S): at 12:35

## 2022-06-09 RX ADMIN — ENOXAPARIN SODIUM 40 MILLIGRAM(S): 100 INJECTION SUBCUTANEOUS at 18:47

## 2022-06-09 RX ADMIN — Medication 50 MICROGRAM(S): at 05:17

## 2022-06-09 NOTE — PROGRESS NOTE ADULT - SUBJECTIVE AND OBJECTIVE BOX
Cardiovascular Disease Progress Note    Overnight events: No acute events overnight. Ms. Cruz denies chest pain or SOB.   Otherwise review of systems negative    Objective Findings:  T(C): 36.4 (06-09-22 @ 04:55), Max: 36.7 (06-08-22 @ 13:16)  HR: 70 (06-09-22 @ 04:55) (61 - 71)  BP: 141/69 (06-09-22 @ 04:55) (108/60 - 154/75)  RR: 18 (06-09-22 @ 04:55) (18 - 18)  SpO2: 97% (06-09-22 @ 04:55) (94% - 99%)  Wt(kg): --  Daily     Daily       Physical Exam:  Gen: NAD; Patient resting comfortably  HEENT: EOMI, Normocephalic/ atraumatic  CV: RRR, normal S1 + S2, no m/r/g  Lungs:  Normal respiratory effort; clear to auscultation bilaterally  Abd: soft, non-tender; bowel sounds present  Ext: No edema; warm and well perfused    Telemetry: N/a    Laboratory Data:                    Inpatient Medications:  MEDICATIONS  (STANDING):  AQUAPHOR (petrolatum Ointment) 1 Application(s) Topical three times a day  aspirin enteric coated 81 milliGRAM(s) Oral daily  atorvastatin 20 milliGRAM(s) Oral at bedtime  chlorhexidine 2% Cloths 1 Application(s) Topical daily  enoxaparin Injectable 40 milliGRAM(s) SubCutaneous every 24 hours  folic acid 1 milliGRAM(s) Oral daily  furosemide    Tablet 20 milliGRAM(s) Oral daily  furosemide   Injectable 20 milliGRAM(s) IV Push once  hydrOXYzine hydrochloride 25 milliGRAM(s) Oral three times a day  levothyroxine 50 MICROGram(s) Oral daily  metoprolol tartrate 12.5 milliGRAM(s) Oral two times a day  urea Oral Powder 15 Gram(s) Oral daily      Assessment:  82 year old female with PMH CAD s/p CABG x3 in 2012 on aspirin, HTN, HLD, dermatitis who presented to the ED for b/l leg redness, swelling, pain that started all of a sudden around 3 days ago    Plan of Care:    #CAD  - s/p CABG x3  - EKG shows sinus rhythm, with no acute ischemic changes  - Pt states she saw her private cardiologist 2 days prior to admission and no changes were made to her medications.   - TTE from 2012 normal   - BNP elevated however pt does not appear fluid overloaded  - Will obtain TTE for further analysis  - Continue ASA, BB and statin   - Outpatient follow with primary cardiologist upon discharge.    #HTN  - BP acceptable  - Continue BB    #HLD  - Statin as ordered    #Cellulitis  - Low suspicion  - Likely chronic venous stasis  - Compression stockings  - Management as per primary team  - US negative for DVT  - Continue PO diuretic        #ACP (advance care planning)-  Advanced care planning was discussed with the patient.   Risks, benefits and alternatives of medical treatment and procedures were discussed in detail and all questions were answered. 30 minutes spent addressing advance care plans.          Over 25 minutes spent on total encounter; more than 50% of the visit was spent counseling and/or coordinating care by the attending physician.   #ACP (advance care planning)-  Advanced care planning was discussed with the patient.  Advanced care planning forms were discussed.  Risks, benefits and alternatives of medical treatment and procedures were discussed in detail and all questions were answered. 30 minutes spent addressing advance care plans.   Adonis Farley D.O.   Cardiovascular Disease  [phone] Cardiovascular Disease Progress Note    Overnight events: No acute events overnight. Ms. Cruz denies chest pain or SOB.   Otherwise review of systems negative    Objective Findings:  T(C): 36.4 (06-09-22 @ 04:55), Max: 36.7 (06-08-22 @ 13:16)  HR: 70 (06-09-22 @ 04:55) (61 - 71)  BP: 141/69 (06-09-22 @ 04:55) (108/60 - 154/75)  RR: 18 (06-09-22 @ 04:55) (18 - 18)  SpO2: 97% (06-09-22 @ 04:55) (94% - 99%)  Wt(kg): --  Daily     Daily       Physical Exam:  Gen: NAD; Patient resting comfortably  HEENT: EOMI, Normocephalic/ atraumatic  CV: RRR, normal S1 + S2, no m/r/g  Lungs:  Normal respiratory effort; clear to auscultation bilaterally  Abd: soft, non-tender; bowel sounds present  Ext: No edema; warm and well perfused    Telemetry: N/a    Laboratory Data:                    Inpatient Medications:  MEDICATIONS  (STANDING):  AQUAPHOR (petrolatum Ointment) 1 Application(s) Topical three times a day  aspirin enteric coated 81 milliGRAM(s) Oral daily  atorvastatin 20 milliGRAM(s) Oral at bedtime  chlorhexidine 2% Cloths 1 Application(s) Topical daily  enoxaparin Injectable 40 milliGRAM(s) SubCutaneous every 24 hours  folic acid 1 milliGRAM(s) Oral daily  furosemide    Tablet 20 milliGRAM(s) Oral daily  furosemide   Injectable 20 milliGRAM(s) IV Push once  hydrOXYzine hydrochloride 25 milliGRAM(s) Oral three times a day  levothyroxine 50 MICROGram(s) Oral daily  metoprolol tartrate 12.5 milliGRAM(s) Oral two times a day  urea Oral Powder 15 Gram(s) Oral daily      Assessment:  82 year old female with PMH CAD s/p CABG x3 in 2012 on aspirin, HTN, HLD, dermatitis who presented to the ED for b/l leg redness, swelling, pain that started all of a sudden around 3 days ago    Plan of Care:    #CAD  - s/p CABG x3  - EKG shows sinus rhythm, with no acute ischemic changes  - TTE from 2012 normal   - TTE 6/6 shows normal LV systolic function, with mild posterior MVP and moderate MR, mild to moderate TR and moderate pulmonary htn.   - No acute intervention warranted at this time.   - Continue ASA, BB and statin   - Outpatient follow up with primary cardiologist upon discharge.       #HTN  - BP acceptable  - Continue BB    #HLD  - Statin as ordered    #Cellulitis  - Low suspicion  - Likely chronic venous stasis  - Compression stockings  - Management as per primary team  - US negative for DVT  - Continue PO diuretic        #ACP (advance care planning)-  Advanced care planning was discussed with the patient.   Risks, benefits and alternatives of medical treatment and procedures were discussed in detail and all questions were answered. 30 minutes spent addressing advance care plans.          Over 25 minutes spent on total encounter; more than 50% of the visit was spent counseling and/or coordinating care by the attending physician.   #ACP (advance care planning)-  Advanced care planning was discussed with the patient.  Advanced care planning forms were discussed.  Risks, benefits and alternatives of medical treatment and procedures were discussed in detail and all questions were answered. 30 minutes spent addressing advance care plans.   Adonis Farley D.O.   Cardiovascular Disease  [phone]

## 2022-06-10 RX ORDER — FUROSEMIDE 40 MG
1 TABLET ORAL
Qty: 30 | Refills: 0
Start: 2022-06-10 | End: 2022-07-09

## 2022-06-10 RX ORDER — HYDROXYZINE HCL 10 MG
1 TABLET ORAL
Qty: 42 | Refills: 0
Start: 2022-06-10 | End: 2022-06-23

## 2022-06-10 RX ADMIN — Medication 81 MILLIGRAM(S): at 13:44

## 2022-06-10 RX ADMIN — Medication 12.5 MILLIGRAM(S): at 19:04

## 2022-06-10 RX ADMIN — Medication 15 GRAM(S): at 13:44

## 2022-06-10 RX ADMIN — Medication 25 MILLIGRAM(S): at 06:24

## 2022-06-10 RX ADMIN — Medication 1 APPLICATION(S): at 21:46

## 2022-06-10 RX ADMIN — Medication 1 MILLIGRAM(S): at 13:44

## 2022-06-10 RX ADMIN — Medication 25 MILLIGRAM(S): at 21:46

## 2022-06-10 RX ADMIN — Medication 25 MILLIGRAM(S): at 13:44

## 2022-06-10 RX ADMIN — CHLORHEXIDINE GLUCONATE 1 APPLICATION(S): 213 SOLUTION TOPICAL at 13:45

## 2022-06-10 RX ADMIN — ENOXAPARIN SODIUM 40 MILLIGRAM(S): 100 INJECTION SUBCUTANEOUS at 19:03

## 2022-06-10 RX ADMIN — Medication 1 APPLICATION(S): at 16:44

## 2022-06-10 RX ADMIN — Medication 50 MICROGRAM(S): at 06:24

## 2022-06-10 RX ADMIN — ATORVASTATIN CALCIUM 20 MILLIGRAM(S): 80 TABLET, FILM COATED ORAL at 21:46

## 2022-06-10 RX ADMIN — Medication 20 MILLIGRAM(S): at 06:25

## 2022-06-10 RX ADMIN — Medication 1 APPLICATION(S): at 06:25

## 2022-06-10 RX ADMIN — Medication 12.5 MILLIGRAM(S): at 06:24

## 2022-06-10 NOTE — PROGRESS NOTE ADULT - SUBJECTIVE AND OBJECTIVE BOX
Cardiovascular Disease Progress Note    Overnight events: No acute events overnight.  Pt continues to complain of constant itching and fatigue.   Otherwise review of systems negative    Objective Findings:  T(C): 36.7 (06-10-22 @ 04:38), Max: 36.7 (06-10-22 @ 04:38)  HR: 89 (06-10-22 @ 04:38) (66 - 89)  BP: 119/66 (06-10-22 @ 04:38) (119/66 - 150/75)  RR: 18 (06-10-22 @ 04:38) (18 - 18)  SpO2: 96% (06-10-22 @ 04:38) (96% - 99%)  Wt(kg): --  Daily     Daily       Physical Exam:  Gen: NAD; Patient resting comfortably  HEENT: EOMI, Normocephalic/ atraumatic  CV: RRR, normal S1 + S2, no m/r/g  Lungs:  Normal respiratory effort; clear to auscultation bilaterally  Abd: soft, non-tender; bowel sounds present  Ext: 1+ edema RLE>LLE warm and well perfused    Telemetry: N/a    Laboratory Data:                    Inpatient Medications:  MEDICATIONS  (STANDING):  AQUAPHOR (petrolatum Ointment) 1 Application(s) Topical three times a day  aspirin enteric coated 81 milliGRAM(s) Oral daily  atorvastatin 20 milliGRAM(s) Oral at bedtime  chlorhexidine 2% Cloths 1 Application(s) Topical daily  enoxaparin Injectable 40 milliGRAM(s) SubCutaneous every 24 hours  folic acid 1 milliGRAM(s) Oral daily  furosemide    Tablet 20 milliGRAM(s) Oral daily  furosemide   Injectable 20 milliGRAM(s) IV Push once  hydrOXYzine hydrochloride 25 milliGRAM(s) Oral three times a day  levothyroxine 50 MICROGram(s) Oral daily  metoprolol tartrate 12.5 milliGRAM(s) Oral two times a day  urea Oral Powder 15 Gram(s) Oral daily      Assessment:  82 year old female with PMH CAD s/p CABG x3 in 2012 on aspirin, HTN, HLD, dermatitis who presented to the ED for b/l leg redness, swelling, pain that started all of a sudden around 3 days ago    Plan of Care:    #CAD  - s/p CABG x3  - EKG shows sinus rhythm, with no acute ischemic changes  - TTE from 2012 normal   - TTE 6/6 shows normal LV systolic function, with mild posterior MVP and moderate MR, mild to moderate TR and moderate pulmonary htn.   - No acute intervention warranted at this time.   - Continue ASA, BB and statin   - Outpatient follow up with primary cardiologist upon discharge.       #HTN  - BP acceptable  - Continue BB    #HLD  - Statin as ordered    #Cellulitis  - Low suspicion  - Likely chronic venous stasis  - Edema improving with diuretics  - Compression stockings  - Management as per primary team  - US negative for DVT          Over 25 minutes spent on total encounter; more than 50% of the visit was spent counseling and/or coordinating care by the attending physician.      Adonis Farley D.O.   Cardiovascular Disease  [phone]

## 2022-06-10 NOTE — PROGRESS NOTE ADULT - SUBJECTIVE AND OBJECTIVE BOX
Patient is a 82y old  Female who presents with a chief complaint of LE cellulitis (10 Pavan 2022 11:36)      SUBJECTIVE / OVERNIGHT EVENTS:    Events noted.  CONSTITUTIONAL: Eczema all over the body  RESPIRATORY: No cough, wheezing,  No shortness of breath  CARDIOVASCULAR: No chest pain, palpitations, dizziness, or leg swelling  GASTROINTESTINAL: No abdominal or epigastric pain. No nausea, vomiting.  NEUROLOGICAL: No headache    MEDICATIONS  (STANDING):  AQUAPHOR (petrolatum Ointment) 1 Application(s) Topical three times a day  aspirin enteric coated 81 milliGRAM(s) Oral daily  atorvastatin 20 milliGRAM(s) Oral at bedtime  chlorhexidine 2% Cloths 1 Application(s) Topical daily  enoxaparin Injectable 40 milliGRAM(s) SubCutaneous every 24 hours  folic acid 1 milliGRAM(s) Oral daily  furosemide    Tablet 20 milliGRAM(s) Oral daily  furosemide   Injectable 20 milliGRAM(s) IV Push once  hydrOXYzine hydrochloride 25 milliGRAM(s) Oral three times a day  levothyroxine 50 MICROGram(s) Oral daily  metoprolol tartrate 12.5 milliGRAM(s) Oral two times a day  urea Oral Powder 15 Gram(s) Oral daily    MEDICATIONS  (PRN):  acetaminophen     Tablet .. 650 milliGRAM(s) Oral every 6 hours PRN Temp greater or equal to 38C (100.4F), Mild Pain (1 - 3)  aluminum hydroxide/magnesium hydroxide/simethicone Suspension 30 milliLiter(s) Oral every 4 hours PRN Dyspepsia  benzocaine 15 mG/menthol 3.6 mG Lozenge 1 Lozenge Oral two times a day PRN Sore Throat  diphenhydrAMINE 25 milliGRAM(s) Oral every 4 hours PRN Rash and/or Itching  melatonin 3 milliGRAM(s) Oral at bedtime PRN Insomnia  ondansetron Injectable 4 milliGRAM(s) IV Push every 8 hours PRN Nausea and/or Vomiting  triamcinolone 0.1% Ointment 1 Application(s) Topical two times a day PRN itching        CAPILLARY BLOOD GLUCOSE        I&O's Summary    09 Jun 2022 07:01  -  10 Pavan 2022 07:00  --------------------------------------------------------  IN: 720 mL / OUT: 0 mL / NET: 720 mL    10 Pavan 2022 07:01  -  10 Pavan 2022 23:24  --------------------------------------------------------  IN: 720 mL / OUT: 0 mL / NET: 720 mL        T(C): 36.4 (06-10-22 @ 20:28), Max: 36.7 (06-10-22 @ 04:38)  HR: 65 (06-10-22 @ 20:28) (65 - 89)  BP: 118/59 (06-10-22 @ 20:28) (118/59 - 139/72)  RR: 18 (06-10-22 @ 20:28) (18 - 18)  SpO2: 98% (06-10-22 @ 20:28) (95% - 98%)    PHYSICAL EXAM:    NECK: Supple, No JVD  CHEST/LUNG: Clear to auscultation bilaterally; No wheezing.  HEART: Regular rate and rhythm; No murmurs, rubs, or gallops  ABDOMEN: Soft, Nontender, Nondistended; Bowel sounds present  EXTREMITIES:   No edema  NEUROLOGY: AAO       LABS:                  CAPILLARY BLOOD GLUCOSE            RADIOLOGY & ADDITIONAL TESTS:    Imaging Personally Reviewed:    Consultant(s) Notes Reviewed:      Care Discussed with Consultants/Other Providers:    Bill Alaniz MD, CMD, FACP    [address] [address]  Office Tel: [phone]  Cell: [phone]

## 2022-06-11 RX ADMIN — Medication 12.5 MILLIGRAM(S): at 05:40

## 2022-06-11 RX ADMIN — Medication 50 MICROGRAM(S): at 05:41

## 2022-06-11 RX ADMIN — Medication 1 APPLICATION(S): at 12:37

## 2022-06-11 RX ADMIN — Medication 12.5 MILLIGRAM(S): at 17:29

## 2022-06-11 RX ADMIN — Medication 20 MILLIGRAM(S): at 05:40

## 2022-06-11 RX ADMIN — Medication 25 MILLIGRAM(S): at 22:31

## 2022-06-11 RX ADMIN — Medication 1 MILLIGRAM(S): at 12:36

## 2022-06-11 RX ADMIN — CHLORHEXIDINE GLUCONATE 1 APPLICATION(S): 213 SOLUTION TOPICAL at 12:37

## 2022-06-11 RX ADMIN — ENOXAPARIN SODIUM 40 MILLIGRAM(S): 100 INJECTION SUBCUTANEOUS at 20:19

## 2022-06-11 RX ADMIN — Medication 25 MILLIGRAM(S): at 13:16

## 2022-06-11 RX ADMIN — Medication 1 APPLICATION(S): at 22:31

## 2022-06-11 RX ADMIN — ATORVASTATIN CALCIUM 20 MILLIGRAM(S): 80 TABLET, FILM COATED ORAL at 22:31

## 2022-06-11 RX ADMIN — Medication 25 MILLIGRAM(S): at 05:40

## 2022-06-11 RX ADMIN — Medication 1 APPLICATION(S): at 05:40

## 2022-06-11 RX ADMIN — Medication 25 MILLIGRAM(S): at 19:23

## 2022-06-11 RX ADMIN — Medication 15 GRAM(S): at 12:36

## 2022-06-11 RX ADMIN — Medication 81 MILLIGRAM(S): at 12:36

## 2022-06-11 RX ADMIN — Medication 1 APPLICATION(S): at 13:06

## 2022-06-11 NOTE — PROGRESS NOTE ADULT - SUBJECTIVE AND OBJECTIVE BOX
Patient is a 82y old  Female who presents with a chief complaint of LE cellulitis (10 Pavan 2022 11:36)      SUBJECTIVE / OVERNIGHT EVENTS:    Events noted.  CONSTITUTIONAL: Eczema all over the body  RESPIRATORY: No cough, wheezing,  No shortness of breath  CARDIOVASCULAR: No chest pain, palpitations, dizziness, or leg swelling  GASTROINTESTINAL: No abdominal or epigastric pain. No nausea, vomiting.  NEUROLOGICAL: No headache    MEDICATIONS  (STANDING):  AQUAPHOR (petrolatum Ointment) 1 Application(s) Topical three times a day  aspirin enteric coated 81 milliGRAM(s) Oral daily  atorvastatin 20 milliGRAM(s) Oral at bedtime  chlorhexidine 2% Cloths 1 Application(s) Topical daily  enoxaparin Injectable 40 milliGRAM(s) SubCutaneous every 24 hours  folic acid 1 milliGRAM(s) Oral daily  furosemide    Tablet 20 milliGRAM(s) Oral daily  furosemide   Injectable 20 milliGRAM(s) IV Push once  hydrOXYzine hydrochloride 25 milliGRAM(s) Oral three times a day  levothyroxine 50 MICROGram(s) Oral daily  metoprolol tartrate 12.5 milliGRAM(s) Oral two times a day  urea Oral Powder 15 Gram(s) Oral daily    MEDICATIONS  (PRN):  acetaminophen     Tablet .. 650 milliGRAM(s) Oral every 6 hours PRN Temp greater or equal to 38C (100.4F), Mild Pain (1 - 3)  aluminum hydroxide/magnesium hydroxide/simethicone Suspension 30 milliLiter(s) Oral every 4 hours PRN Dyspepsia  benzocaine 15 mG/menthol 3.6 mG Lozenge 1 Lozenge Oral two times a day PRN Sore Throat  diphenhydrAMINE 25 milliGRAM(s) Oral every 4 hours PRN Rash and/or Itching  melatonin 3 milliGRAM(s) Oral at bedtime PRN Insomnia  ondansetron Injectable 4 milliGRAM(s) IV Push every 8 hours PRN Nausea and/or Vomiting  triamcinolone 0.1% Ointment 1 Application(s) Topical two times a day PRN itching        CAPILLARY BLOOD GLUCOSE        I&O's Summary    09 Jun 2022 07:01  -  10 Pavan 2022 07:00  --------------------------------------------------------  IN: 720 mL / OUT: 0 mL / NET: 720 mL    10 Pavan 2022 07:01  -  10 Pavan 2022 23:24  --------------------------------------------------------  IN: 720 mL / OUT: 0 mL / NET: 720 mL        T(C): 36.4 (06-10-22 @ 20:28), Max: 36.7 (06-10-22 @ 04:38)  HR: 65 (06-10-22 @ 20:28) (65 - 89)  BP: 118/59 (06-10-22 @ 20:28) (118/59 - 139/72)  RR: 18 (06-10-22 @ 20:28) (18 - 18)  SpO2: 98% (06-10-22 @ 20:28) (95% - 98%)    PHYSICAL EXAM:    NECK: Supple, No JVD  CHEST/LUNG: Clear to auscultation bilaterally; No wheezing.  HEART: Regular rate and rhythm; No murmurs, rubs, or gallops  ABDOMEN: Soft, Nontender, Nondistended; Bowel sounds present  EXTREMITIES:   No edema  NEUROLOGY: AAO       LABS:                  CAPILLARY BLOOD GLUCOSE            RADIOLOGY & ADDITIONAL TESTS:    Imaging Personally Reviewed:    Consultant(s) Notes Reviewed:      Care Discussed with Consultants/Other Providers:    Bill Alaniz MD, CMD, FACP    [address] [address]  Office Tel: [phone]  Cell: [phone]

## 2022-06-11 NOTE — PROGRESS NOTE ADULT - PROBLEM SELECTOR PLAN 4
DVT ppx: lovenox

## 2022-06-11 NOTE — PROGRESS NOTE ADULT - PROBLEM SELECTOR PLAN 3
- cw home statin

## 2022-06-11 NOTE — PROGRESS NOTE ADULT - SUBJECTIVE AND OBJECTIVE BOX
Cardiovascular Disease Progress Note    Overnight events: No acute events overnight. Pt more calm this morning. She denies chest pain or SOB.    Otherwise review of systems negative    Objective Findings:  T(C): 36.6 (06-11-22 @ 05:01), Max: 36.6 (06-10-22 @ 13:38)  HR: 68 (06-11-22 @ 05:01) (65 - 76)  BP: 121/60 (06-11-22 @ 05:01) (118/59 - 139/72)  RR: 18 (06-11-22 @ 05:01) (18 - 18)  SpO2: 100% (06-11-22 @ 05:01) (95% - 100%)  Wt(kg): --  Daily     Daily       Physical Exam:  Gen: NAD; Patient resting comfortably  HEENT: EOMI, Normocephalic/ atraumatic  CV: RRR, normal S1 + S2, no m/r/g  Lungs:  Normal respiratory effort; clear to auscultation bilaterally  Abd: soft, non-tender; bowel sounds present  Ext: No edema; warm and well perfused    Telemetry: Na    Laboratory Data:                    Inpatient Medications:  MEDICATIONS  (STANDING):  AQUAPHOR (petrolatum Ointment) 1 Application(s) Topical three times a day  aspirin enteric coated 81 milliGRAM(s) Oral daily  atorvastatin 20 milliGRAM(s) Oral at bedtime  chlorhexidine 2% Cloths 1 Application(s) Topical daily  enoxaparin Injectable 40 milliGRAM(s) SubCutaneous every 24 hours  folic acid 1 milliGRAM(s) Oral daily  furosemide    Tablet 20 milliGRAM(s) Oral daily  furosemide   Injectable 20 milliGRAM(s) IV Push once  hydrOXYzine hydrochloride 25 milliGRAM(s) Oral three times a day  levothyroxine 50 MICROGram(s) Oral daily  metoprolol tartrate 12.5 milliGRAM(s) Oral two times a day  urea Oral Powder 15 Gram(s) Oral daily      Assessment:  82 year old female with PMH CAD s/p CABG x3 in 2012 on aspirin, HTN, HLD, dermatitis who presented to the ED for b/l leg redness, swelling, pain that started all of a sudden around 3 days ago    Plan of Care:    #CAD  - s/p CABG x3  - EKG shows sinus rhythm, with no acute ischemic changes  - TTE from 2012 normal   - TTE 6/6 shows normal LV systolic function, with mild posterior MVP and moderate MR, mild to moderate TR and moderate pulmonary htn.   - No acute intervention warranted at this time.   - Continue ASA, BB and statin   - Outpatient follow up with primary cardiologist upon discharge.       #HTN  - BP acceptable  - Continue BB    #HLD  - Statin as ordered    #Cellulitis  - Low suspicion  - Likely chronic venous stasis  - Edema improving with diuretics  - Compression stockings  - Management as per primary team  - US negative for DVT          Over 25 minutes spent on total encounter; more than 50% of the visit was spent counseling and/or coordinating care by the attending physician.      Adonis Farley D.O.   Cardiovascular Disease  [phone]

## 2022-06-12 LAB — SARS-COV-2 RNA SPEC QL NAA+PROBE: SIGNIFICANT CHANGE UP

## 2022-06-12 RX ADMIN — ATORVASTATIN CALCIUM 20 MILLIGRAM(S): 80 TABLET, FILM COATED ORAL at 22:07

## 2022-06-12 RX ADMIN — Medication 12.5 MILLIGRAM(S): at 05:25

## 2022-06-12 RX ADMIN — Medication 1 MILLIGRAM(S): at 11:37

## 2022-06-12 RX ADMIN — Medication 50 MICROGRAM(S): at 05:24

## 2022-06-12 RX ADMIN — Medication 81 MILLIGRAM(S): at 11:37

## 2022-06-12 RX ADMIN — Medication 25 MILLIGRAM(S): at 22:07

## 2022-06-12 RX ADMIN — Medication 25 MILLIGRAM(S): at 13:17

## 2022-06-12 RX ADMIN — Medication 1 APPLICATION(S): at 22:06

## 2022-06-12 RX ADMIN — Medication 1 APPLICATION(S): at 11:35

## 2022-06-12 RX ADMIN — Medication 20 MILLIGRAM(S): at 05:25

## 2022-06-12 RX ADMIN — CHLORHEXIDINE GLUCONATE 1 APPLICATION(S): 213 SOLUTION TOPICAL at 11:39

## 2022-06-12 RX ADMIN — Medication 12.5 MILLIGRAM(S): at 17:21

## 2022-06-12 RX ADMIN — Medication 15 GRAM(S): at 11:37

## 2022-06-12 RX ADMIN — Medication 25 MILLIGRAM(S): at 05:24

## 2022-06-12 RX ADMIN — Medication 25 MILLIGRAM(S): at 05:25

## 2022-06-12 RX ADMIN — Medication 1 APPLICATION(S): at 13:18

## 2022-06-12 NOTE — PROGRESS NOTE ADULT - ASSESSMENT
This is a 82 year old female with PMH CAD on aspirin, HTN, HLD, dermatitis who presented to the ED for b/l leg redness, swelling, pain that started all of a sudden around 3 days ago.     Eczema:    Cw current mx    DC planning

## 2022-06-12 NOTE — PROGRESS NOTE ADULT - SUBJECTIVE AND OBJECTIVE BOX
Cardiovascular Disease Progress Note    Overnight events: No acute events overnight.  Pt denies chest pain or SOB. Pt still complaining of dry skin and pruritis.   Otherwise review of systems negative    Objective Findings:  T(C): 36.7 (06-12-22 @ 11:45), Max: 36.7 (06-11-22 @ 21:45)  HR: 60 (06-12-22 @ 11:45) (60 - 73)  BP: 127/85 (06-12-22 @ 11:45) (121/64 - 127/85)  RR: 17 (06-12-22 @ 11:45) (16 - 18)  SpO2: 97% (06-12-22 @ 11:45) (97% - 99%)  Wt(kg): --  Daily     Daily       Physical Exam:  Gen: NAD; Patient resting comfortably  HEENT: EOMI, Normocephalic/ atraumatic  CV: RRR, normal S1 + S2, no m/r/g  Lungs:  Normal respiratory effort; clear to auscultation bilaterally  Abd: soft, non-tender; bowel sounds present  Ext: Trace LE edema; warm and well perfused    Telemetry: N/a    Laboratory Data:                    Inpatient Medications:  MEDICATIONS  (STANDING):  AQUAPHOR (petrolatum Ointment) 1 Application(s) Topical three times a day  aspirin enteric coated 81 milliGRAM(s) Oral daily  atorvastatin 20 milliGRAM(s) Oral at bedtime  chlorhexidine 2% Cloths 1 Application(s) Topical daily  enoxaparin Injectable 40 milliGRAM(s) SubCutaneous every 24 hours  folic acid 1 milliGRAM(s) Oral daily  furosemide    Tablet 20 milliGRAM(s) Oral daily  furosemide   Injectable 20 milliGRAM(s) IV Push once  hydrOXYzine hydrochloride 25 milliGRAM(s) Oral three times a day  levothyroxine 50 MICROGram(s) Oral daily  metoprolol tartrate 12.5 milliGRAM(s) Oral two times a day  urea Oral Powder 15 Gram(s) Oral daily      Assessment:  82 year old female with PMH CAD s/p CABG x3 in 2012 on aspirin, HTN, HLD, dermatitis who presented to the ED for b/l leg redness, swelling, pain that started all of a sudden around 3 days ago    Plan of Care:    #CAD  - s/p CABG x3  - EKG shows sinus rhythm, with no acute ischemic changes  - TTE from 2012 normal   - TTE 6/6 shows normal LV systolic function, with mild posterior MVP and moderate MR, mild to moderate TR and moderate pulmonary htn.   - No acute intervention warranted at this time.   - Continue ASA, BB and statin   - Outpatient follow up with primary cardiologist upon discharge.       #HTN  - BP acceptable  - Continue BB    #HLD  - Statin as ordered    #Cellulitis  - Low suspicion  - Likely chronic venous stasis  - Edema improving with diuretics  - Compression stockings  - Management as per primary team  - US negative for DVT            Over 25 minutes spent on total encounter; more than 50% of the visit was spent counseling and/or coordinating care by the attending physician.      Adonis Falrey D.O.   Cardiovascular Disease  [phone]

## 2022-06-12 NOTE — PROGRESS NOTE ADULT - SUBJECTIVE AND OBJECTIVE BOX
Patient is a 82y old  Female who presents with a chief complaint of LE cellulitis (12 Jun 2022 13:01)      SUBJECTIVE / OVERNIGHT EVENTS:    Events noted.  CONSTITUTIONAL: Eczema  RESPIRATORY: No cough, wheezing,  No shortness of breath  CARDIOVASCULAR: No chest pain, palpitations, dizziness, or leg swelling  GASTROINTESTINAL: No abdominal or epigastric pain. No nausea, vomiting.  NEUROLOGICAL: No headache    MEDICATIONS  (STANDING):  AQUAPHOR (petrolatum Ointment) 1 Application(s) Topical three times a day  aspirin enteric coated 81 milliGRAM(s) Oral daily  atorvastatin 20 milliGRAM(s) Oral at bedtime  chlorhexidine 2% Cloths 1 Application(s) Topical daily  enoxaparin Injectable 40 milliGRAM(s) SubCutaneous every 24 hours  folic acid 1 milliGRAM(s) Oral daily  furosemide    Tablet 20 milliGRAM(s) Oral daily  furosemide   Injectable 20 milliGRAM(s) IV Push once  hydrOXYzine hydrochloride 25 milliGRAM(s) Oral three times a day  levothyroxine 50 MICROGram(s) Oral daily  metoprolol tartrate 12.5 milliGRAM(s) Oral two times a day  urea Oral Powder 15 Gram(s) Oral daily    MEDICATIONS  (PRN):  acetaminophen     Tablet .. 650 milliGRAM(s) Oral every 6 hours PRN Temp greater or equal to 38C (100.4F), Mild Pain (1 - 3)  aluminum hydroxide/magnesium hydroxide/simethicone Suspension 30 milliLiter(s) Oral every 4 hours PRN Dyspepsia  benzocaine 15 mG/menthol 3.6 mG Lozenge 1 Lozenge Oral two times a day PRN Sore Throat  diphenhydrAMINE 25 milliGRAM(s) Oral every 4 hours PRN Rash and/or Itching  melatonin 3 milliGRAM(s) Oral at bedtime PRN Insomnia  ondansetron Injectable 4 milliGRAM(s) IV Push every 8 hours PRN Nausea and/or Vomiting  triamcinolone 0.1% Ointment 1 Application(s) Topical two times a day PRN itching        CAPILLARY BLOOD GLUCOSE        I&O's Summary    11 Jun 2022 07:01  -  12 Jun 2022 07:00  --------------------------------------------------------  IN: 1320 mL / OUT: 0 mL / NET: 1320 mL    12 Jun 2022 07:01  -  12 Jun 2022 22:19  --------------------------------------------------------  IN: 1200 mL / OUT: 0 mL / NET: 1200 mL        T(C): 36.6 (06-12-22 @ 21:21), Max: 36.7 (06-12-22 @ 11:45)  HR: 63 (06-12-22 @ 21:21) (60 - 65)  BP: 138/68 (06-12-22 @ 21:21) (116/62 - 138/68)  RR: 17 (06-12-22 @ 21:21) (16 - 18)  SpO2: 98% (06-12-22 @ 21:21) (97% - 98%)    PHYSICAL EXAM:  GENERAL: NAD  NECK: Supple, No JVD  CHEST/LUNG: Clear to auscultation bilaterally; No wheezing.  HEART: Regular rate and rhythm; No murmurs, rubs, or gallops  ABDOMEN: Soft, Nontender, Nondistended; Bowel sounds present  EXTREMITIES:   No edema  NEUROLOGY: AAO X 3      LABS:                  CAPILLARY BLOOD GLUCOSE            RADIOLOGY & ADDITIONAL TESTS:    Imaging Personally Reviewed:    Consultant(s) Notes Reviewed:      Care Discussed with Consultants/Other Providers:    Bill Alaniz MD, CMD, FACP    [address] [place] [address]  Office Tel: [phone]  Cell: [phone]

## 2022-06-12 NOTE — PROGRESS NOTE ADULT - PROBLEM SELECTOR PROBLEM 1
CAD (coronary artery disease)
Cellulitis

## 2022-06-12 NOTE — PROGRESS NOTE ADULT - PROBLEM SELECTOR PLAN 1
Ruled out  Likely stasis dermatitis  ID eval appreciated  -Monitor off abx
Ruled out  Likely stasis dermatitis  -Monitor off abx
Ruled out  Likely stasis dermatitis  -Monitor off abx
- c/w home asa and BB  leg edema   cardio following
Ruled out  Likely stasis dermatitis  -Monitor off abx
Ruled out  Likely stasis dermatitis  -Monitor off abx
Ruled out  Likely stasis dermatitis  ID eval appreciated  -Monitor off abx
- With LE erythema, warmth, tenderness, swelling. Mild leukocytosis   - Xrays w/ diffuse soft tissue swelling. No tracking soft tissue gas collections, radiopaque foreign bodies, or gross radiographic evidence for osteomyelitis.  - On IV cefazolin   - f/u bcx  ID eval called.  Derm eval appreciated  -
Ruled out  Likely stasis dermatitis  ID eval appreciated  -Monitor off abx

## 2022-06-13 ENCOUNTER — TRANSCRIPTION ENCOUNTER (OUTPATIENT)
Age: 82
End: 2022-06-13

## 2022-06-13 VITALS
DIASTOLIC BLOOD PRESSURE: 58 MMHG | HEART RATE: 63 BPM | OXYGEN SATURATION: 99 % | SYSTOLIC BLOOD PRESSURE: 132 MMHG | RESPIRATION RATE: 17 BRPM | TEMPERATURE: 98 F

## 2022-06-13 PROCEDURE — U0005: CPT

## 2022-06-13 PROCEDURE — 0225U NFCT DS DNA&RNA 21 SARSCOV2: CPT

## 2022-06-13 PROCEDURE — 84295 ASSAY OF SERUM SODIUM: CPT

## 2022-06-13 PROCEDURE — 80053 COMPREHEN METABOLIC PANEL: CPT

## 2022-06-13 PROCEDURE — 87186 SC STD MICRODIL/AGAR DIL: CPT

## 2022-06-13 PROCEDURE — 93970 EXTREMITY STUDY: CPT

## 2022-06-13 PROCEDURE — 85027 COMPLETE CBC AUTOMATED: CPT

## 2022-06-13 PROCEDURE — 99285 EMERGENCY DEPT VISIT HI MDM: CPT

## 2022-06-13 PROCEDURE — 83880 ASSAY OF NATRIURETIC PEPTIDE: CPT

## 2022-06-13 PROCEDURE — 73590 X-RAY EXAM OF LOWER LEG: CPT

## 2022-06-13 PROCEDURE — 81001 URINALYSIS AUTO W/SCOPE: CPT

## 2022-06-13 PROCEDURE — 87641 MR-STAPH DNA AMP PROBE: CPT

## 2022-06-13 PROCEDURE — 96375 TX/PRO/DX INJ NEW DRUG ADDON: CPT

## 2022-06-13 PROCEDURE — 94640 AIRWAY INHALATION TREATMENT: CPT

## 2022-06-13 PROCEDURE — 85018 HEMOGLOBIN: CPT

## 2022-06-13 PROCEDURE — 85730 THROMBOPLASTIN TIME PARTIAL: CPT

## 2022-06-13 PROCEDURE — 71045 X-RAY EXAM CHEST 1 VIEW: CPT

## 2022-06-13 PROCEDURE — 82330 ASSAY OF CALCIUM: CPT

## 2022-06-13 PROCEDURE — 87640 STAPH A DNA AMP PROBE: CPT

## 2022-06-13 PROCEDURE — 36415 COLL VENOUS BLD VENIPUNCTURE: CPT

## 2022-06-13 PROCEDURE — 82803 BLOOD GASES ANY COMBINATION: CPT

## 2022-06-13 PROCEDURE — 84132 ASSAY OF SERUM POTASSIUM: CPT

## 2022-06-13 PROCEDURE — 93005 ELECTROCARDIOGRAM TRACING: CPT

## 2022-06-13 PROCEDURE — 80048 BASIC METABOLIC PNL TOTAL CA: CPT

## 2022-06-13 PROCEDURE — 87040 BLOOD CULTURE FOR BACTERIA: CPT

## 2022-06-13 PROCEDURE — 96374 THER/PROPH/DIAG INJ IV PUSH: CPT

## 2022-06-13 PROCEDURE — 87086 URINE CULTURE/COLONY COUNT: CPT

## 2022-06-13 PROCEDURE — 85014 HEMATOCRIT: CPT

## 2022-06-13 PROCEDURE — 82435 ASSAY OF BLOOD CHLORIDE: CPT

## 2022-06-13 PROCEDURE — U0003: CPT

## 2022-06-13 PROCEDURE — 97116 GAIT TRAINING THERAPY: CPT

## 2022-06-13 PROCEDURE — 93306 TTE W/DOPPLER COMPLETE: CPT

## 2022-06-13 PROCEDURE — 97530 THERAPEUTIC ACTIVITIES: CPT

## 2022-06-13 PROCEDURE — 85025 COMPLETE CBC W/AUTO DIFF WBC: CPT

## 2022-06-13 PROCEDURE — 82947 ASSAY GLUCOSE BLOOD QUANT: CPT

## 2022-06-13 PROCEDURE — 83605 ASSAY OF LACTIC ACID: CPT

## 2022-06-13 PROCEDURE — 97161 PT EVAL LOW COMPLEX 20 MIN: CPT

## 2022-06-13 PROCEDURE — 97110 THERAPEUTIC EXERCISES: CPT

## 2022-06-13 PROCEDURE — 85610 PROTHROMBIN TIME: CPT

## 2022-06-13 RX ADMIN — Medication 20 MILLIGRAM(S): at 05:08

## 2022-06-13 RX ADMIN — Medication 50 MICROGRAM(S): at 05:08

## 2022-06-13 RX ADMIN — Medication 1 APPLICATION(S): at 05:08

## 2022-06-13 RX ADMIN — Medication 12.5 MILLIGRAM(S): at 05:07

## 2022-06-13 RX ADMIN — Medication 25 MILLIGRAM(S): at 05:07

## 2022-06-13 NOTE — DISCHARGE NOTE NURSING/CASE MANAGEMENT/SOCIAL WORK - PATIENT PORTAL LINK FT
You can access the FollowMyHealth Patient Portal offered by [facility] by registering at the following website: [email or web address]. By joining [email or web address]’s [email or web address] portal, you will also be able to view your health information using other applications (apps) compatible with our system.

## 2022-06-13 NOTE — PROGRESS NOTE ADULT - REASON FOR ADMISSION
LE cellulitis

## 2022-06-13 NOTE — PROGRESS NOTE ADULT - PROVIDER SPECIALTY LIST ADULT
Cardiology
Internal Medicine
Cardiology
Cardiology
Infectious Disease
Cardiology
Internal Medicine

## 2022-06-13 NOTE — PROGRESS NOTE ADULT - SUBJECTIVE AND OBJECTIVE BOX
Cardiovascular Disease Progress Note    Overnight events: No acute events overnight.  Ms. Cruz denies chest pain or SOB. Continue to have diffuse pruritis.   Otherwise review of systems negative    Objective Findings:  T(C): 36.9 (06-13-22 @ 05:01), Max: 36.9 (06-13-22 @ 05:01)  HR: 63 (06-13-22 @ 05:01) (60 - 65)  BP: 132/58 (06-13-22 @ 05:01) (116/62 - 138/68)  RR: 17 (06-13-22 @ 05:01) (17 - 18)  SpO2: 99% (06-13-22 @ 05:01) (97% - 99%)  Wt(kg): --  Daily     Daily       Physical Exam:  Gen: NAD; Patient resting comfortably  HEENT: EOMI, Normocephalic/ atraumatic  CV: RRR, normal S1 + S2, no m/r/g  Lungs:  Normal respiratory effort; clear to auscultation bilaterally  Abd: soft, non-tender; bowel sounds present  Ext: No edema; warm and well perfused. Dry skin throughout body with peeling /    Telemetry: N/a    Laboratory Data:                    Inpatient Medications:  MEDICATIONS  (STANDING):  AQUAPHOR (petrolatum Ointment) 1 Application(s) Topical three times a day  aspirin enteric coated 81 milliGRAM(s) Oral daily  atorvastatin 20 milliGRAM(s) Oral at bedtime  chlorhexidine 2% Cloths 1 Application(s) Topical daily  enoxaparin Injectable 40 milliGRAM(s) SubCutaneous every 24 hours  folic acid 1 milliGRAM(s) Oral daily  furosemide    Tablet 20 milliGRAM(s) Oral daily  furosemide   Injectable 20 milliGRAM(s) IV Push once  hydrOXYzine hydrochloride 25 milliGRAM(s) Oral three times a day  levothyroxine 50 MICROGram(s) Oral daily  metoprolol tartrate 12.5 milliGRAM(s) Oral two times a day  urea Oral Powder 15 Gram(s) Oral daily      Assessment:  82 year old female with PMH CAD s/p CABG x3 in 2012 on aspirin, HTN, HLD, dermatitis who presented to the ED for b/l leg redness, swelling, pain that started all of a sudden around 3 days ago    Plan of Care:    #CAD  - s/p CABG x3  - EKG shows sinus rhythm, with no acute ischemic changes  - TTE from 2012 normal   - TTE 6/6 shows normal LV systolic function, with mild posterior MVP and moderate MR, mild to moderate TR and moderate pulmonary htn.   - No acute intervention warranted at this time.   - Continue ASA, BB and statin   - Outpatient follow up with primary cardiologist upon discharge.     #HLD  - Statin as ordered    #Cellulitis  - Likely chronic venous stasis  - Compression stockings  - Management as per primary team  - US negative for DVT          Plan of Care:          Over 25 minutes spent on total encounter; more than 50% of the visit was spent counseling and/or coordinating care by the attending physician.      Adonis Farley D.O.   Cardiovascular Disease  [phone]

## 2023-08-28 NOTE — PATIENT PROFILE ADULT - FUNCTIONAL ASSESSMENT - BASIC MOBILITY 6.
4 = No assist / stand by assistance pt endorsed wanting RLE wound to be checked d/t redness being present over two weeks. pt denies cp, n, v, lightheadedness, dizziness, sob, fevers, chills. pt ambulatory. pt ambulatory with steady gait. pt breathing even and unlabored with equal chest rise and fall.

## 2023-09-06 NOTE — CONSULT NOTE ADULT - PSYCHIATRIC
negative Cartilage Graft Text: The defect edges were debeveled with a #15 scalpel blade. Given the location of the defect, shape of the defect, the fact the defect involved a full thickness cartilage defect a cartilage graft was deemed most appropriate.  An appropriate donor site was identified, cleansed, and anesthetized. The cartilage graft was then harvested and transferred to the recipient site, oriented appropriately and then sutured into place.  The secondary defect was then repaired using a primary closure.

## 2023-09-21 ENCOUNTER — NON-APPOINTMENT (OUTPATIENT)
Age: 83
End: 2023-09-21

## 2023-09-21 ENCOUNTER — APPOINTMENT (OUTPATIENT)
Dept: OPHTHALMOLOGY | Facility: CLINIC | Age: 83
End: 2023-09-21
Payer: MEDICARE

## 2023-09-21 PROCEDURE — 92133 CPTRZD OPH DX IMG PST SGM ON: CPT

## 2023-09-21 PROCEDURE — 92014 COMPRE OPH EXAM EST PT 1/>: CPT

## 2023-12-24 ENCOUNTER — EMERGENCY (EMERGENCY)
Facility: HOSPITAL | Age: 83
LOS: 1 days | Discharge: HOME | End: 2023-12-24
Attending: EMERGENCY MEDICINE
Payer: MEDICARE

## 2023-12-24 VITALS
RESPIRATION RATE: 20 BRPM | HEART RATE: 79 BPM | SYSTOLIC BLOOD PRESSURE: 147 MMHG | OXYGEN SATURATION: 98 % | TEMPERATURE: 98 F | WEIGHT: 104.94 LBS | DIASTOLIC BLOOD PRESSURE: 81 MMHG | HEIGHT: 63 IN

## 2023-12-24 VITALS
OXYGEN SATURATION: 96 % | DIASTOLIC BLOOD PRESSURE: 87 MMHG | TEMPERATURE: 97 F | RESPIRATION RATE: 18 BRPM | SYSTOLIC BLOOD PRESSURE: 143 MMHG | HEART RATE: 87 BPM

## 2023-12-24 LAB
ALBUMIN SERPL ELPH-MCNC: 3.7 G/DL — SIGNIFICANT CHANGE UP (ref 3.3–5)
ALBUMIN SERPL ELPH-MCNC: 3.7 G/DL — SIGNIFICANT CHANGE UP (ref 3.3–5)
ALP SERPL-CCNC: 80 U/L — SIGNIFICANT CHANGE UP (ref 40–120)
ALP SERPL-CCNC: 80 U/L — SIGNIFICANT CHANGE UP (ref 40–120)
ALT FLD-CCNC: 18 U/L — SIGNIFICANT CHANGE UP (ref 10–45)
ALT FLD-CCNC: 18 U/L — SIGNIFICANT CHANGE UP (ref 10–45)
ANION GAP SERPL CALC-SCNC: 7 MMOL/L — SIGNIFICANT CHANGE UP (ref 5–17)
ANION GAP SERPL CALC-SCNC: 7 MMOL/L — SIGNIFICANT CHANGE UP (ref 5–17)
APPEARANCE UR: ABNORMAL
APPEARANCE UR: ABNORMAL
AST SERPL-CCNC: 18 U/L — SIGNIFICANT CHANGE UP (ref 10–40)
AST SERPL-CCNC: 18 U/L — SIGNIFICANT CHANGE UP (ref 10–40)
BACTERIA # UR AUTO: NEGATIVE /HPF — SIGNIFICANT CHANGE UP
BACTERIA # UR AUTO: NEGATIVE /HPF — SIGNIFICANT CHANGE UP
BASOPHILS # BLD AUTO: 0.04 K/UL — SIGNIFICANT CHANGE UP (ref 0–0.2)
BASOPHILS # BLD AUTO: 0.04 K/UL — SIGNIFICANT CHANGE UP (ref 0–0.2)
BASOPHILS NFR BLD AUTO: 0.4 % — SIGNIFICANT CHANGE UP (ref 0–2)
BASOPHILS NFR BLD AUTO: 0.4 % — SIGNIFICANT CHANGE UP (ref 0–2)
BILIRUB SERPL-MCNC: 0.4 MG/DL — SIGNIFICANT CHANGE UP (ref 0.2–1.2)
BILIRUB SERPL-MCNC: 0.4 MG/DL — SIGNIFICANT CHANGE UP (ref 0.2–1.2)
BILIRUB UR-MCNC: NEGATIVE — SIGNIFICANT CHANGE UP
BILIRUB UR-MCNC: NEGATIVE — SIGNIFICANT CHANGE UP
BUN SERPL-MCNC: 18 MG/DL — SIGNIFICANT CHANGE UP (ref 7–23)
BUN SERPL-MCNC: 18 MG/DL — SIGNIFICANT CHANGE UP (ref 7–23)
CALCIUM SERPL-MCNC: 9.8 MG/DL — SIGNIFICANT CHANGE UP (ref 8.4–10.5)
CALCIUM SERPL-MCNC: 9.8 MG/DL — SIGNIFICANT CHANGE UP (ref 8.4–10.5)
CAST: 0 /LPF — SIGNIFICANT CHANGE UP (ref 0–4)
CAST: 0 /LPF — SIGNIFICANT CHANGE UP (ref 0–4)
CHLORIDE SERPL-SCNC: 96 MMOL/L — SIGNIFICANT CHANGE UP (ref 96–108)
CHLORIDE SERPL-SCNC: 96 MMOL/L — SIGNIFICANT CHANGE UP (ref 96–108)
CO2 SERPL-SCNC: 27 MMOL/L — SIGNIFICANT CHANGE UP (ref 22–31)
CO2 SERPL-SCNC: 27 MMOL/L — SIGNIFICANT CHANGE UP (ref 22–31)
COLOR SPEC: YELLOW — SIGNIFICANT CHANGE UP
COLOR SPEC: YELLOW — SIGNIFICANT CHANGE UP
CREAT SERPL-MCNC: 0.54 MG/DL — SIGNIFICANT CHANGE UP (ref 0.5–1.3)
CREAT SERPL-MCNC: 0.54 MG/DL — SIGNIFICANT CHANGE UP (ref 0.5–1.3)
DIFF PNL FLD: NEGATIVE — SIGNIFICANT CHANGE UP
DIFF PNL FLD: NEGATIVE — SIGNIFICANT CHANGE UP
EGFR: 90 ML/MIN/1.73M2 — SIGNIFICANT CHANGE UP
EGFR: 90 ML/MIN/1.73M2 — SIGNIFICANT CHANGE UP
EOSINOPHIL # BLD AUTO: 0.33 K/UL — SIGNIFICANT CHANGE UP (ref 0–0.5)
EOSINOPHIL # BLD AUTO: 0.33 K/UL — SIGNIFICANT CHANGE UP (ref 0–0.5)
EOSINOPHIL NFR BLD AUTO: 3.7 % — SIGNIFICANT CHANGE UP (ref 0–6)
EOSINOPHIL NFR BLD AUTO: 3.7 % — SIGNIFICANT CHANGE UP (ref 0–6)
GLUCOSE SERPL-MCNC: 198 MG/DL — HIGH (ref 70–99)
GLUCOSE SERPL-MCNC: 198 MG/DL — HIGH (ref 70–99)
GLUCOSE UR QL: NEGATIVE MG/DL — SIGNIFICANT CHANGE UP
GLUCOSE UR QL: NEGATIVE MG/DL — SIGNIFICANT CHANGE UP
HCT VFR BLD CALC: 38.7 % — SIGNIFICANT CHANGE UP (ref 34.5–45)
HCT VFR BLD CALC: 38.7 % — SIGNIFICANT CHANGE UP (ref 34.5–45)
HGB BLD-MCNC: 12.8 G/DL — SIGNIFICANT CHANGE UP (ref 11.5–15.5)
HGB BLD-MCNC: 12.8 G/DL — SIGNIFICANT CHANGE UP (ref 11.5–15.5)
IMM GRANULOCYTES NFR BLD AUTO: 0.6 % — SIGNIFICANT CHANGE UP (ref 0–0.9)
IMM GRANULOCYTES NFR BLD AUTO: 0.6 % — SIGNIFICANT CHANGE UP (ref 0–0.9)
KETONES UR-MCNC: NEGATIVE MG/DL — SIGNIFICANT CHANGE UP
KETONES UR-MCNC: NEGATIVE MG/DL — SIGNIFICANT CHANGE UP
LEUKOCYTE ESTERASE UR-ACNC: NEGATIVE — SIGNIFICANT CHANGE UP
LEUKOCYTE ESTERASE UR-ACNC: NEGATIVE — SIGNIFICANT CHANGE UP
LYMPHOCYTES # BLD AUTO: 1.25 K/UL — SIGNIFICANT CHANGE UP (ref 1–3.3)
LYMPHOCYTES # BLD AUTO: 1.25 K/UL — SIGNIFICANT CHANGE UP (ref 1–3.3)
LYMPHOCYTES # BLD AUTO: 14 % — SIGNIFICANT CHANGE UP (ref 13–44)
LYMPHOCYTES # BLD AUTO: 14 % — SIGNIFICANT CHANGE UP (ref 13–44)
MCHC RBC-ENTMCNC: 27.9 PG — SIGNIFICANT CHANGE UP (ref 27–34)
MCHC RBC-ENTMCNC: 27.9 PG — SIGNIFICANT CHANGE UP (ref 27–34)
MCHC RBC-ENTMCNC: 33.1 GM/DL — SIGNIFICANT CHANGE UP (ref 32–36)
MCHC RBC-ENTMCNC: 33.1 GM/DL — SIGNIFICANT CHANGE UP (ref 32–36)
MCV RBC AUTO: 84.5 FL — SIGNIFICANT CHANGE UP (ref 80–100)
MCV RBC AUTO: 84.5 FL — SIGNIFICANT CHANGE UP (ref 80–100)
MONOCYTES # BLD AUTO: 0.67 K/UL — SIGNIFICANT CHANGE UP (ref 0–0.9)
MONOCYTES # BLD AUTO: 0.67 K/UL — SIGNIFICANT CHANGE UP (ref 0–0.9)
MONOCYTES NFR BLD AUTO: 7.5 % — SIGNIFICANT CHANGE UP (ref 2–14)
MONOCYTES NFR BLD AUTO: 7.5 % — SIGNIFICANT CHANGE UP (ref 2–14)
NEUTROPHILS # BLD AUTO: 6.6 K/UL — SIGNIFICANT CHANGE UP (ref 1.8–7.4)
NEUTROPHILS # BLD AUTO: 6.6 K/UL — SIGNIFICANT CHANGE UP (ref 1.8–7.4)
NEUTROPHILS NFR BLD AUTO: 73.8 % — SIGNIFICANT CHANGE UP (ref 43–77)
NEUTROPHILS NFR BLD AUTO: 73.8 % — SIGNIFICANT CHANGE UP (ref 43–77)
NITRITE UR-MCNC: NEGATIVE — SIGNIFICANT CHANGE UP
NITRITE UR-MCNC: NEGATIVE — SIGNIFICANT CHANGE UP
NRBC # BLD: 0 /100 WBCS — SIGNIFICANT CHANGE UP (ref 0–0)
NRBC # BLD: 0 /100 WBCS — SIGNIFICANT CHANGE UP (ref 0–0)
PH UR: 7.5 — SIGNIFICANT CHANGE UP (ref 5–8)
PH UR: 7.5 — SIGNIFICANT CHANGE UP (ref 5–8)
PLATELET # BLD AUTO: 230 K/UL — SIGNIFICANT CHANGE UP (ref 150–400)
PLATELET # BLD AUTO: 230 K/UL — SIGNIFICANT CHANGE UP (ref 150–400)
POTASSIUM SERPL-MCNC: 3.9 MMOL/L — SIGNIFICANT CHANGE UP (ref 3.5–5.3)
POTASSIUM SERPL-MCNC: 3.9 MMOL/L — SIGNIFICANT CHANGE UP (ref 3.5–5.3)
POTASSIUM SERPL-SCNC: 3.9 MMOL/L — SIGNIFICANT CHANGE UP (ref 3.5–5.3)
POTASSIUM SERPL-SCNC: 3.9 MMOL/L — SIGNIFICANT CHANGE UP (ref 3.5–5.3)
PROT SERPL-MCNC: 6.6 G/DL — SIGNIFICANT CHANGE UP (ref 6–8.3)
PROT SERPL-MCNC: 6.6 G/DL — SIGNIFICANT CHANGE UP (ref 6–8.3)
PROT UR-MCNC: NEGATIVE MG/DL — SIGNIFICANT CHANGE UP
PROT UR-MCNC: NEGATIVE MG/DL — SIGNIFICANT CHANGE UP
RBC # BLD: 4.58 M/UL — SIGNIFICANT CHANGE UP (ref 3.8–5.2)
RBC # BLD: 4.58 M/UL — SIGNIFICANT CHANGE UP (ref 3.8–5.2)
RBC # FLD: 14 % — SIGNIFICANT CHANGE UP (ref 10.3–14.5)
RBC # FLD: 14 % — SIGNIFICANT CHANGE UP (ref 10.3–14.5)
RBC CASTS # UR COMP ASSIST: 0 /HPF — SIGNIFICANT CHANGE UP (ref 0–4)
RBC CASTS # UR COMP ASSIST: 0 /HPF — SIGNIFICANT CHANGE UP (ref 0–4)
SODIUM SERPL-SCNC: 130 MMOL/L — LOW (ref 135–145)
SODIUM SERPL-SCNC: 130 MMOL/L — LOW (ref 135–145)
SP GR SPEC: 1.01 — SIGNIFICANT CHANGE UP (ref 1–1.03)
SP GR SPEC: 1.01 — SIGNIFICANT CHANGE UP (ref 1–1.03)
SQUAMOUS # UR AUTO: 0 /HPF — SIGNIFICANT CHANGE UP (ref 0–5)
SQUAMOUS # UR AUTO: 0 /HPF — SIGNIFICANT CHANGE UP (ref 0–5)
UROBILINOGEN FLD QL: 0.2 MG/DL — SIGNIFICANT CHANGE UP (ref 0.2–1)
UROBILINOGEN FLD QL: 0.2 MG/DL — SIGNIFICANT CHANGE UP (ref 0.2–1)
WBC # BLD: 8.94 K/UL — SIGNIFICANT CHANGE UP (ref 3.8–10.5)
WBC # BLD: 8.94 K/UL — SIGNIFICANT CHANGE UP (ref 3.8–10.5)
WBC # FLD AUTO: 8.94 K/UL — SIGNIFICANT CHANGE UP (ref 3.8–10.5)
WBC # FLD AUTO: 8.94 K/UL — SIGNIFICANT CHANGE UP (ref 3.8–10.5)
WBC UR QL: 1 /HPF — SIGNIFICANT CHANGE UP (ref 0–5)
WBC UR QL: 1 /HPF — SIGNIFICANT CHANGE UP (ref 0–5)

## 2023-12-24 PROCEDURE — 71045 X-RAY EXAM CHEST 1 VIEW: CPT | Mod: 26

## 2023-12-24 PROCEDURE — 81001 URINALYSIS AUTO W/SCOPE: CPT

## 2023-12-24 PROCEDURE — 85025 COMPLETE CBC W/AUTO DIFF WBC: CPT

## 2023-12-24 PROCEDURE — 71045 X-RAY EXAM CHEST 1 VIEW: CPT

## 2023-12-24 PROCEDURE — 72170 X-RAY EXAM OF PELVIS: CPT | Mod: 26

## 2023-12-24 PROCEDURE — 99284 EMERGENCY DEPT VISIT MOD MDM: CPT

## 2023-12-24 PROCEDURE — 99285 EMERGENCY DEPT VISIT HI MDM: CPT | Mod: 25

## 2023-12-24 PROCEDURE — 72170 X-RAY EXAM OF PELVIS: CPT

## 2023-12-24 PROCEDURE — 93005 ELECTROCARDIOGRAM TRACING: CPT

## 2023-12-24 PROCEDURE — 80053 COMPREHEN METABOLIC PANEL: CPT

## 2023-12-24 RX ORDER — SODIUM CHLORIDE 9 MG/ML
1000 INJECTION INTRAMUSCULAR; INTRAVENOUS; SUBCUTANEOUS ONCE
Refills: 0 | Status: COMPLETED | OUTPATIENT
Start: 2023-12-24 | End: 2023-12-24

## 2023-12-24 RX ADMIN — SODIUM CHLORIDE 1000 MILLILITER(S): 9 INJECTION INTRAMUSCULAR; INTRAVENOUS; SUBCUTANEOUS at 11:32

## 2023-12-24 NOTE — ED PROVIDER NOTE - NSFOLLOWUPINSTRUCTIONS_ED_ALL_ED_FT
YOU WERE SEEN FOR dizziness and fall    YOU HAD labs and xrays done.     FOLLOW UP WITH YOUR PRIMARY CARE PROVIDER    RETURN TO THE EMERGENCY DEPARTMENT FOR worsening dizziness, fall, head injury, or any new/concerning symptoms.

## 2023-12-24 NOTE — ED PROVIDER NOTE - PATIENT PORTAL LINK FT
You can access the FollowMyHealth Patient Portal offered by [facility] by registering at the following website: [email or web address]. By joining [employer]’s FollowMyHealth portal, you will also be able to view your health information using other applications (apps) compatible with our system. You can access the FollowMyHealth Patient Portal offered by [facility] by registering at the following website: [email or web address]. By joining [employer]’s FollowMyHealth portal, you will also be able to view your health information using other applications (apps) compatible with our system.

## 2023-12-24 NOTE — ED PROVIDER NOTE - CLINICAL SUMMARY MEDICAL DECISION MAKING FREE TEXT BOX
83-year-old female with multiple medical issues, presented today with episode of dizziness and fall, she denies any pain anywhere no loss of consciousness, no chest pain or shortness of breath, will obtain infectious metabolic workup, UA to rule out UTI chest x-ray and pelvic x-ray and reassess ZR

## 2023-12-24 NOTE — ED ADULT NURSE NOTE - NSFALLHARMRISKINTERV_ED_ALL_ED
Communicate risk of Fall with Harm to all staff, patient, and family/Provide visual cue: red socks, yellow wristband, yellow gown, etc/Reinforce activity limits and safety measures with patient and family/Bed in lowest position, wheels locked, appropriate side rails in place/Call bell, personal items and telephone in reach/Instruct patient to call for assistance before getting out of bed/chair/stretcher/Non-slip footwear applied when patient is off stretcher/[place] to call system/Physically safe environment - no spills, clutter or unnecessary equipment/Purposeful Proactive Rounding/Room/bathroom lighting operational, light cord in reach Communicate risk of Fall with Harm to all staff, patient, and family/Provide visual cue: red socks, yellow wristband, yellow gown, etc/Reinforce activity limits and safety measures with patient and family/Bed in lowest position, wheels locked, appropriate side rails in place/Call bell, personal items and telephone in reach/Instruct patient to call for assistance before getting out of bed/chair/stretcher/Non-slip footwear applied when patient is off stretcher/[place] to call system/Physically safe environment - no spills, clutter or unnecessary equipment/Purposeful Proactive Rounding/Room/bathroom lighting operational, light cord in reach

## 2023-12-24 NOTE — ED PROVIDER NOTE - OBJECTIVE STATEMENT
83-year-old female brought from the still assisted living after a fall, patient denies any pain anywhere but stays before the fall she felt episodes of dizziness,   history of hypertension, CAD, diabetes, status post stents, 2011, on daily aspirin, according to the son she gets episodes of hyponatremia and having dizziness, patient denies any pain anywhere, mental status is at baseline

## 2023-12-24 NOTE — ED ADULT TRIAGE NOTE - AS HEIGHT TYPE
stated Rituxan Pregnancy And Lactation Text: This medication is Pregnancy Category C and it isn't know if it is safe during pregnancy. It is unknown if this medication is excreted in breast milk but similar antibodies are known to be excreted.

## 2023-12-24 NOTE — ED PROVIDER NOTE - PHYSICAL EXAMINATION
Primary Survey:  A - airway intact  B - b/l breath sounds, symmetrical chest rise  C - equal radial pulses  D - GCS 15  E - Patient exposed    Secondary Survey:  Head: NCAT  EENT: no facial TTP, dentition intact, PERRL 3mm  Neck: no midline c-spine tenderness, t-spine tenderness, l-spine tenderness.   Chest: no chest wall TTP, RRR  Lungs: CTA b/l  Abd: soft, NTND  Pelvis: stable  MSK: No obvious deformity. No tenderness to palpation.  Neuro: CN II-XII intact. 5/5 strength bilaterally upper and lower extremities. Normal sensation bilaterally upper and lower extremities.

## 2023-12-24 NOTE — ED ADULT NURSE NOTE - OBJECTIVE STATEMENT
Pt is a 83y F, AxO3, PMH HTN, HLD on ASA, presents to ED from assisted living after an unwitnessed fall today. Pt states that while walking back from he bathroom she got dizzy and "wobbly", losing her balance and falling to a carpeted ground. Denies head trauma and LOC. Pt was then able to crawl to a phone to call downstairs to a nurse. States she felt dizzy after the fall, dizziness resolved at time of arrival to [facility]. On assessment, breathing spontaneous and unlabored, skin is warm and dry, no obvious signs of injury. Denies chest pain, SOB,  symptoms, recent illness, fevers, chills. Pt is well appearing, speaking full sentences, VSS, no acute distress noted at this time. Pt is a 83y F, AxO3, PMH HTN, HLD on ASA, presents to ED from assisted living after an unwitnessed fall today. Pt states that while walking back from he bathroom she got dizzy and "wobbly", losing her balance and falling to a carpeted ground. Denies head trauma and LOC. Pt was then able to crawl to a phone to call downstairs to a nurse. States she felt dizzy after the fall, dizziness resolved at time of arrival to [facility]. On assessment, breathing spontaneous and unlabored, skin is warm and dry, no obvious signs of injury. Denies chest pain, SOB,  symptoms, recent illness, fevers, chills. Pt is well appearing, speaking full sentences, VSS, no acute distress noted at this time.

## 2024-02-01 ENCOUNTER — APPOINTMENT (OUTPATIENT)
Dept: OPHTHALMOLOGY | Facility: CLINIC | Age: 84
End: 2024-02-01

## 2024-04-11 NOTE — ED ADULT NURSE NOTE - PAIN: PRESENCE, MLM
Went in to give patient discharge instructions and lab results. Patient no longer in room and had taken her papers from earlier. Unable to give further instructions at this time.    complains of pain/discomfort

## 2024-05-07 NOTE — ED ADULT TRIAGE NOTE - CHIEF COMPLAINT QUOTE
Clinical Note  The patient is here today to receive diagnostic medial nerve branch block to bilateral L4-L5 L5-S1 assist with pain.    Procedure Note  The patient was taken to the procedure room, was placed into a prone position. The area was prepped and draped sterilely in the normal fashion. The patient was throughout the procedure monitored by the nursing staff. The skin and subcutaneous tissue was anesthetized with 1% lidocaine. Then 22-gauge spinal needles were introduced into the approximation of the medial nerve branches at the above mentioned level, confirmed in AP and oblique view with negative aspiration of heme and CSF. The patient received 0.5 mL of 0.5% Marcaine per site. The patient tolerated the procedure well, was taken to the recovery room, allowed to recover for sufficient amount of time and then was discharged home in stable condition. The patient was advised to followup with the [facility] to reassess the improvement and determine the need for the radiofrequency ablation.    Thank you for allowing me to participate in the care of this patient.      
Bilat leg weeping and swelling

## 2024-07-16 ENCOUNTER — EMERGENCY (EMERGENCY)
Facility: HOSPITAL | Age: 84
LOS: 1 days | Discharge: HOME | End: 2024-07-16
Attending: EMERGENCY MEDICINE
Payer: MEDICARE

## 2024-07-16 VITALS
DIASTOLIC BLOOD PRESSURE: 84 MMHG | HEART RATE: 78 BPM | SYSTOLIC BLOOD PRESSURE: 135 MMHG | OXYGEN SATURATION: 99 % | RESPIRATION RATE: 17 BRPM | TEMPERATURE: 99 F

## 2024-07-16 LAB
ALBUMIN SERPL ELPH-MCNC: 4 G/DL — SIGNIFICANT CHANGE UP (ref 3.3–5)
ALP SERPL-CCNC: 80 U/L — SIGNIFICANT CHANGE UP (ref 40–120)
ALT FLD-CCNC: 19 U/L — SIGNIFICANT CHANGE UP (ref 10–45)
ANION GAP SERPL CALC-SCNC: 13 MMOL/L — SIGNIFICANT CHANGE UP (ref 5–17)
APPEARANCE UR: CLEAR — SIGNIFICANT CHANGE UP
AST SERPL-CCNC: 23 U/L — SIGNIFICANT CHANGE UP (ref 10–40)
BACTERIA # UR AUTO: NEGATIVE /HPF — SIGNIFICANT CHANGE UP
BASOPHILS # BLD AUTO: 0.06 K/UL — SIGNIFICANT CHANGE UP (ref 0–0.2)
BASOPHILS NFR BLD AUTO: 0.6 % — SIGNIFICANT CHANGE UP (ref 0–2)
BILIRUB SERPL-MCNC: 0.3 MG/DL — SIGNIFICANT CHANGE UP (ref 0.2–1.2)
BILIRUB UR-MCNC: NEGATIVE — SIGNIFICANT CHANGE UP
BUN SERPL-MCNC: 37 MG/DL — HIGH (ref 7–23)
CALCIUM SERPL-MCNC: 10.9 MG/DL — HIGH (ref 8.4–10.5)
CAST: 0 /LPF — SIGNIFICANT CHANGE UP (ref 0–4)
CHLORIDE SERPL-SCNC: 92 MMOL/L — LOW (ref 96–108)
CO2 SERPL-SCNC: 22 MMOL/L — SIGNIFICANT CHANGE UP (ref 22–31)
COLOR SPEC: YELLOW — SIGNIFICANT CHANGE UP
CREAT ?TM UR-MCNC: 18 MG/DL — SIGNIFICANT CHANGE UP
CREAT SERPL-MCNC: 0.57 MG/DL — SIGNIFICANT CHANGE UP (ref 0.5–1.3)
DIFF PNL FLD: NEGATIVE — SIGNIFICANT CHANGE UP
EGFR: 88 ML/MIN/1.73M2 — SIGNIFICANT CHANGE UP
EOSINOPHIL # BLD AUTO: 0.52 K/UL — HIGH (ref 0–0.5)
EOSINOPHIL NFR BLD AUTO: 5.6 % — SIGNIFICANT CHANGE UP (ref 0–6)
GLUCOSE SERPL-MCNC: 101 MG/DL — HIGH (ref 70–99)
GLUCOSE UR QL: NEGATIVE MG/DL — SIGNIFICANT CHANGE UP
HCT VFR BLD CALC: 36.6 % — SIGNIFICANT CHANGE UP (ref 34.5–45)
HGB BLD-MCNC: 12.4 G/DL — SIGNIFICANT CHANGE UP (ref 11.5–15.5)
IMM GRANULOCYTES NFR BLD AUTO: 0.4 % — SIGNIFICANT CHANGE UP (ref 0–0.9)
KETONES UR-MCNC: NEGATIVE MG/DL — SIGNIFICANT CHANGE UP
LEUKOCYTE ESTERASE UR-ACNC: ABNORMAL
LYMPHOCYTES # BLD AUTO: 1.17 K/UL — SIGNIFICANT CHANGE UP (ref 1–3.3)
LYMPHOCYTES # BLD AUTO: 12.7 % — LOW (ref 13–44)
MCHC RBC-ENTMCNC: 27.9 PG — SIGNIFICANT CHANGE UP (ref 27–34)
MCHC RBC-ENTMCNC: 33.9 GM/DL — SIGNIFICANT CHANGE UP (ref 32–36)
MCV RBC AUTO: 82.2 FL — SIGNIFICANT CHANGE UP (ref 80–100)
MONOCYTES # BLD AUTO: 0.65 K/UL — SIGNIFICANT CHANGE UP (ref 0–0.9)
MONOCYTES NFR BLD AUTO: 7 % — SIGNIFICANT CHANGE UP (ref 2–14)
NEUTROPHILS # BLD AUTO: 6.8 K/UL — SIGNIFICANT CHANGE UP (ref 1.8–7.4)
NEUTROPHILS NFR BLD AUTO: 73.7 % — SIGNIFICANT CHANGE UP (ref 43–77)
NITRITE UR-MCNC: NEGATIVE — SIGNIFICANT CHANGE UP
NRBC # BLD: 0 /100 WBCS — SIGNIFICANT CHANGE UP (ref 0–0)
OSMOLALITY SERPL: 277 MOSMOL/KG — LOW (ref 280–301)
OSMOLALITY UR: 379 MOS/KG — SIGNIFICANT CHANGE UP (ref 300–900)
PH UR: 8 — SIGNIFICANT CHANGE UP (ref 5–8)
PLATELET # BLD AUTO: 274 K/UL — SIGNIFICANT CHANGE UP (ref 150–400)
POTASSIUM SERPL-MCNC: 4.7 MMOL/L — SIGNIFICANT CHANGE UP (ref 3.5–5.3)
POTASSIUM SERPL-SCNC: 4.7 MMOL/L — SIGNIFICANT CHANGE UP (ref 3.5–5.3)
POTASSIUM UR-SCNC: 32 MMOL/L — SIGNIFICANT CHANGE UP
PROT SERPL-MCNC: 7.5 G/DL — SIGNIFICANT CHANGE UP (ref 6–8.3)
PROT UR-MCNC: NEGATIVE MG/DL — SIGNIFICANT CHANGE UP
RBC # BLD: 4.45 M/UL — SIGNIFICANT CHANGE UP (ref 3.8–5.2)
RBC # FLD: 13.9 % — SIGNIFICANT CHANGE UP (ref 10.3–14.5)
RBC CASTS # UR COMP ASSIST: 0 /HPF — SIGNIFICANT CHANGE UP (ref 0–4)
SODIUM SERPL-SCNC: 127 MMOL/L — LOW (ref 135–145)
SODIUM UR-SCNC: 75 MMOL/L — SIGNIFICANT CHANGE UP
SP GR SPEC: 1.01 — SIGNIFICANT CHANGE UP (ref 1–1.03)
SQUAMOUS # UR AUTO: 0 /HPF — SIGNIFICANT CHANGE UP (ref 0–5)
UROBILINOGEN FLD QL: 0.2 MG/DL — SIGNIFICANT CHANGE UP (ref 0.2–1)
WBC # BLD: 9.24 K/UL — SIGNIFICANT CHANGE UP (ref 3.8–10.5)
WBC # FLD AUTO: 9.24 K/UL — SIGNIFICANT CHANGE UP (ref 3.8–10.5)
WBC UR QL: 9 /HPF — HIGH (ref 0–5)

## 2024-07-16 PROCEDURE — 36000 PLACE NEEDLE IN VEIN: CPT

## 2024-07-16 PROCEDURE — 80053 COMPREHEN METABOLIC PANEL: CPT

## 2024-07-16 PROCEDURE — 81001 URINALYSIS AUTO W/SCOPE: CPT

## 2024-07-16 PROCEDURE — 70450 CT HEAD/BRAIN W/O DYE: CPT | Mod: 26,MC

## 2024-07-16 PROCEDURE — 84300 ASSAY OF URINE SODIUM: CPT

## 2024-07-16 PROCEDURE — 83935 ASSAY OF URINE OSMOLALITY: CPT

## 2024-07-16 PROCEDURE — 70450 CT HEAD/BRAIN W/O DYE: CPT | Mod: MC

## 2024-07-16 PROCEDURE — 71045 X-RAY EXAM CHEST 1 VIEW: CPT

## 2024-07-16 PROCEDURE — 71045 X-RAY EXAM CHEST 1 VIEW: CPT | Mod: 26

## 2024-07-16 PROCEDURE — 83930 ASSAY OF BLOOD OSMOLALITY: CPT

## 2024-07-16 PROCEDURE — 84133 ASSAY OF URINE POTASSIUM: CPT

## 2024-07-16 PROCEDURE — 80048 BASIC METABOLIC PNL TOTAL CA: CPT

## 2024-07-16 PROCEDURE — 93005 ELECTROCARDIOGRAM TRACING: CPT

## 2024-07-16 PROCEDURE — 85025 COMPLETE CBC W/AUTO DIFF WBC: CPT

## 2024-07-16 PROCEDURE — 99285 EMERGENCY DEPT VISIT HI MDM: CPT

## 2024-07-16 PROCEDURE — 82570 ASSAY OF URINE CREATININE: CPT

## 2024-07-16 PROCEDURE — 99285 EMERGENCY DEPT VISIT HI MDM: CPT | Mod: 25

## 2024-07-16 RX ORDER — SODIUM CHLORIDE 0.9 % (FLUSH) 0.9 %
500 SYRINGE (ML) INJECTION ONCE
Refills: 0 | Status: COMPLETED | OUTPATIENT
Start: 2024-07-16 | End: 2024-07-16

## 2024-07-16 RX ADMIN — Medication 500 MILLILITER(S): at 19:19

## 2024-07-17 VITALS
RESPIRATION RATE: 18 BRPM | SYSTOLIC BLOOD PRESSURE: 164 MMHG | HEART RATE: 84 BPM | TEMPERATURE: 98 F | DIASTOLIC BLOOD PRESSURE: 80 MMHG | OXYGEN SATURATION: 98 %

## 2024-07-17 LAB
ANION GAP SERPL CALC-SCNC: 10 MMOL/L — SIGNIFICANT CHANGE UP (ref 5–17)
BUN SERPL-MCNC: 20 MG/DL — SIGNIFICANT CHANGE UP (ref 7–23)
CALCIUM SERPL-MCNC: 10.3 MG/DL — SIGNIFICANT CHANGE UP (ref 8.4–10.5)
CHLORIDE SERPL-SCNC: 97 MMOL/L — SIGNIFICANT CHANGE UP (ref 96–108)
CO2 SERPL-SCNC: 25 MMOL/L — SIGNIFICANT CHANGE UP (ref 22–31)
CREAT SERPL-MCNC: 0.53 MG/DL — SIGNIFICANT CHANGE UP (ref 0.5–1.3)
EGFR: 90 ML/MIN/1.73M2 — SIGNIFICANT CHANGE UP
GLUCOSE SERPL-MCNC: 112 MG/DL — HIGH (ref 70–99)
POTASSIUM SERPL-MCNC: 4.1 MMOL/L — SIGNIFICANT CHANGE UP (ref 3.5–5.3)
POTASSIUM SERPL-SCNC: 4.1 MMOL/L — SIGNIFICANT CHANGE UP (ref 3.5–5.3)
SODIUM SERPL-SCNC: 132 MMOL/L — LOW (ref 135–145)

## 2024-08-26 ENCOUNTER — NON-APPOINTMENT (OUTPATIENT)
Age: 84
End: 2024-08-26

## 2024-12-16 NOTE — ED ADULT NURSE NOTE - PAIN: BODY LOCATION
Per MD pt is medically cleared for discharge today 12/16/24. CM met with patient to discuss transition of care. Pt is to be transitioned to home with no formal home care services, pt offered home care services, pt declined home care services. CM explained home care services, expectations, process, insurance provisions and home safety with pt verbalizing understanding. Pt aware of plan and in agreement / verbalizes understanding. IMM discussed / copy given. Pt verbalized understanding. [facility]. community MD is Dr. Ross. Pt stated they would make their own follow up appointments. Pt denies DME and need for usage. Pt stated his daughter will transport pt home. All questions answered. CM discussed plan with MD and RN. CM to remain available through hospitalization. 
generalized body pain